# Patient Record
Sex: FEMALE | Race: WHITE | NOT HISPANIC OR LATINO | URBAN - METROPOLITAN AREA
[De-identification: names, ages, dates, MRNs, and addresses within clinical notes are randomized per-mention and may not be internally consistent; named-entity substitution may affect disease eponyms.]

---

## 2018-03-01 ENCOUNTER — OUTPATIENT (OUTPATIENT)
Dept: OUTPATIENT SERVICES | Facility: HOSPITAL | Age: 23
LOS: 1 days | End: 2018-03-01
Payer: MEDICAID

## 2018-03-01 PROCEDURE — G9001: CPT

## 2018-03-11 ENCOUNTER — EMERGENCY (EMERGENCY)
Facility: HOSPITAL | Age: 23
LOS: 1 days | Discharge: HOME | End: 2018-03-11
Attending: EMERGENCY MEDICINE | Admitting: EMERGENCY MEDICINE
Payer: MEDICAID

## 2018-03-11 VITALS
RESPIRATION RATE: 18 BRPM | HEART RATE: 75 BPM | SYSTOLIC BLOOD PRESSURE: 109 MMHG | TEMPERATURE: 98 F | OXYGEN SATURATION: 99 % | DIASTOLIC BLOOD PRESSURE: 70 MMHG

## 2018-03-11 DIAGNOSIS — N93.9 ABNORMAL UTERINE AND VAGINAL BLEEDING, UNSPECIFIED: ICD-10-CM

## 2018-03-11 LAB
ALBUMIN SERPL ELPH-MCNC: 3.8 G/DL — SIGNIFICANT CHANGE UP (ref 3.4–5)
ALP SERPL-CCNC: 61 U/L — SIGNIFICANT CHANGE UP (ref 40–120)
ALT FLD-CCNC: 15 U/L — SIGNIFICANT CHANGE UP (ref 12–42)
ANION GAP SERPL CALC-SCNC: 10 MMOL/L — SIGNIFICANT CHANGE UP (ref 9–16)
APPEARANCE UR: CLEAR — SIGNIFICANT CHANGE UP
AST SERPL-CCNC: 16 U/L — SIGNIFICANT CHANGE UP (ref 15–37)
BACTERIA # UR AUTO: PRESENT /HPF
BASOPHILS NFR BLD AUTO: 0.7 % — SIGNIFICANT CHANGE UP (ref 0–2)
BILIRUB SERPL-MCNC: 0.6 MG/DL — SIGNIFICANT CHANGE UP (ref 0.2–1.2)
BILIRUB UR-MCNC: NEGATIVE — SIGNIFICANT CHANGE UP
BUN SERPL-MCNC: 11 MG/DL — SIGNIFICANT CHANGE UP (ref 7–23)
CALCIUM SERPL-MCNC: 9.2 MG/DL — SIGNIFICANT CHANGE UP (ref 8.5–10.5)
CHLORIDE SERPL-SCNC: 105 MMOL/L — SIGNIFICANT CHANGE UP (ref 96–108)
CO2 SERPL-SCNC: 25 MMOL/L — SIGNIFICANT CHANGE UP (ref 22–31)
COLOR SPEC: YELLOW — SIGNIFICANT CHANGE UP
CREAT SERPL-MCNC: 0.72 MG/DL — SIGNIFICANT CHANGE UP (ref 0.5–1.3)
DIFF PNL FLD: (no result)
EOSINOPHIL NFR BLD AUTO: 0.5 % — SIGNIFICANT CHANGE UP (ref 0–6)
EPI CELLS # UR: SIGNIFICANT CHANGE UP /HPF
GLUCOSE SERPL-MCNC: 103 MG/DL — HIGH (ref 70–99)
GLUCOSE UR QL: NEGATIVE — SIGNIFICANT CHANGE UP
HCG SERPL-ACNC: <1 MIU/ML — SIGNIFICANT CHANGE UP
HCG UR QL: NEGATIVE — SIGNIFICANT CHANGE UP
HCT VFR BLD CALC: 37.8 % — SIGNIFICANT CHANGE UP (ref 34.5–45)
HGB BLD-MCNC: 12.4 G/DL — SIGNIFICANT CHANGE UP (ref 11.5–15.5)
IMM GRANULOCYTES NFR BLD AUTO: 0.3 % — SIGNIFICANT CHANGE UP (ref 0–1.5)
KETONES UR-MCNC: NEGATIVE — SIGNIFICANT CHANGE UP
LEUKOCYTE ESTERASE UR-ACNC: NEGATIVE — SIGNIFICANT CHANGE UP
LYMPHOCYTES # BLD AUTO: 10.9 % — LOW (ref 13–44)
MCHC RBC-ENTMCNC: 29 PG — SIGNIFICANT CHANGE UP (ref 27–34)
MCHC RBC-ENTMCNC: 32.8 G/DL — SIGNIFICANT CHANGE UP (ref 32–36)
MCV RBC AUTO: 88.3 FL — SIGNIFICANT CHANGE UP (ref 80–100)
MONOCYTES NFR BLD AUTO: 8.1 % — SIGNIFICANT CHANGE UP (ref 2–14)
NEUTROPHILS NFR BLD AUTO: 79.5 % — HIGH (ref 43–77)
NITRITE UR-MCNC: NEGATIVE — SIGNIFICANT CHANGE UP
PH UR: 5.5 — SIGNIFICANT CHANGE UP (ref 5–8)
PLATELET # BLD AUTO: 220 K/UL — SIGNIFICANT CHANGE UP (ref 150–400)
POTASSIUM SERPL-MCNC: 3.7 MMOL/L — SIGNIFICANT CHANGE UP (ref 3.5–5.3)
POTASSIUM SERPL-SCNC: 3.7 MMOL/L — SIGNIFICANT CHANGE UP (ref 3.5–5.3)
PROT SERPL-MCNC: 7.4 G/DL — SIGNIFICANT CHANGE UP (ref 6.4–8.2)
PROT UR-MCNC: NEGATIVE MG/DL — SIGNIFICANT CHANGE UP
RBC # BLD: 4.28 M/UL — SIGNIFICANT CHANGE UP (ref 3.8–5.2)
RBC # FLD: 12.7 % — SIGNIFICANT CHANGE UP (ref 10.3–16.9)
RBC CASTS # UR COMP ASSIST: > 10 /HPF
SODIUM SERPL-SCNC: 140 MMOL/L — SIGNIFICANT CHANGE UP (ref 132–145)
SP GR SPEC: 1.02 — SIGNIFICANT CHANGE UP (ref 1–1.03)
UROBILINOGEN FLD QL: 0.2 E.U./DL — SIGNIFICANT CHANGE UP
WBC # BLD: 11.4 K/UL — HIGH (ref 3.8–10.5)
WBC # FLD AUTO: 11.4 K/UL — HIGH (ref 3.8–10.5)
WBC UR QL: < 5 /HPF — SIGNIFICANT CHANGE UP

## 2018-03-11 PROCEDURE — 99284 EMERGENCY DEPT VISIT MOD MDM: CPT

## 2018-03-11 PROCEDURE — 76830 TRANSVAGINAL US NON-OB: CPT | Mod: 26

## 2018-03-11 RX ORDER — SODIUM CHLORIDE 9 MG/ML
1000 INJECTION INTRAMUSCULAR; INTRAVENOUS; SUBCUTANEOUS ONCE
Qty: 0 | Refills: 0 | Status: COMPLETED | OUTPATIENT
Start: 2018-03-11 | End: 2018-03-11

## 2018-03-11 RX ORDER — KETOROLAC TROMETHAMINE 30 MG/ML
30 SYRINGE (ML) INJECTION ONCE
Qty: 0 | Refills: 0 | Status: DISCONTINUED | OUTPATIENT
Start: 2018-03-11 | End: 2018-03-11

## 2018-03-11 RX ADMIN — SODIUM CHLORIDE 2000 MILLILITER(S): 9 INJECTION INTRAMUSCULAR; INTRAVENOUS; SUBCUTANEOUS at 13:55

## 2018-03-11 RX ADMIN — Medication 30 MILLIGRAM(S): at 14:02

## 2018-03-11 RX ADMIN — Medication 30 MILLIGRAM(S): at 13:54

## 2018-03-11 NOTE — ED PROVIDER NOTE - PROGRESS NOTE DETAILS
Neg HCG, normal H/H, UA no signs of infection. Pelvic exam: non tender, no CMT, no massess, some blood on vag vault, no discharge. Pt added that she had a dose of plan B last week. This might explain change in her usual cycle. She will see her OB GYN tomorrow.

## 2018-03-11 NOTE — ED PROVIDER NOTE - OBJECTIVE STATEMENT
21 y/o F with no significant pmhx presenting for evaluation of vaginal bleeding which started today. LNMP 3 weeks ago. Pt reports regular periods and states this bleeding is too soon and not as heavy as her regular period. Also endorsing suprapubic pain on R side.     No f/c, dysuria, hematuria, nausea, vomiting, diarrhea. 21 y/o F with no significant pmhx presenting for evaluation of vaginal bleeding which started today. LNMP 3 weeks ago. Pt endorses regular periods and states this bleeding is too soon and not as heavy as her regular period. Also endorsing suprapubic pain on R side. Not on OCPs. Sexually active with barrier protection. Took a negative pregnancy test at home on Monday. No f/c, dysuria, hematuria, flank pain, nausea, vomiting, diarrhea.

## 2018-03-15 DIAGNOSIS — R69 ILLNESS, UNSPECIFIED: ICD-10-CM

## 2019-02-24 ENCOUNTER — EMERGENCY (EMERGENCY)
Facility: HOSPITAL | Age: 24
LOS: 1 days | Discharge: HOME | End: 2019-02-24
Attending: EMERGENCY MEDICINE | Admitting: EMERGENCY MEDICINE
Payer: OTHER MISCELLANEOUS

## 2019-02-24 VITALS
SYSTOLIC BLOOD PRESSURE: 112 MMHG | OXYGEN SATURATION: 100 % | HEART RATE: 78 BPM | TEMPERATURE: 98 F | DIASTOLIC BLOOD PRESSURE: 74 MMHG | RESPIRATION RATE: 18 BRPM

## 2019-02-24 VITALS
RESPIRATION RATE: 18 BRPM | SYSTOLIC BLOOD PRESSURE: 109 MMHG | OXYGEN SATURATION: 100 % | DIASTOLIC BLOOD PRESSURE: 71 MMHG | HEART RATE: 80 BPM | TEMPERATURE: 98 F

## 2019-02-24 DIAGNOSIS — S70.02XA CONTUSION OF LEFT HIP, INITIAL ENCOUNTER: ICD-10-CM
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
## 2019-02-24 DIAGNOSIS — S83.92XA SPRAIN OF UNSPECIFIED SITE OF LEFT KNEE, INITIAL ENCOUNTER: ICD-10-CM

## 2019-02-24 DIAGNOSIS — M25.552 PAIN IN LEFT HIP: ICD-10-CM
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
## 2019-02-24 PROCEDURE — 73562 X-RAY EXAM OF KNEE 3: CPT

## 2019-02-24 PROCEDURE — 99283 EMERGENCY DEPT VISIT LOW MDM: CPT

## 2019-02-24 PROCEDURE — 99284 EMERGENCY DEPT VISIT MOD MDM: CPT

## 2019-02-24 PROCEDURE — 73562 X-RAY EXAM OF KNEE 3: CPT | Mod: 26,LT

## 2019-02-24 PROCEDURE — 73502 X-RAY EXAM HIP UNI 2-3 VIEWS: CPT

## 2019-02-24 PROCEDURE — 73502 X-RAY EXAM HIP UNI 2-3 VIEWS: CPT | Mod: 26,LT

## 2019-02-24 RX ORDER — IBUPROFEN 200 MG
600 TABLET ORAL ONCE
Qty: 0 | Refills: 0 | Status: COMPLETED | OUTPATIENT
Start: 2019-02-24 | End: 2019-02-24

## 2019-02-24 RX ADMIN — Medication 600 MILLIGRAM(S): at 18:38

## 2019-02-24 NOTE — ED PROVIDER NOTE - OBJECTIVE STATEMENT
here with pain in left knee/ hip after trip and fall going down stairs.  No dizziness/ loc.  Says pain worse with movement/ hurts to try and bend knee.  Did not take anything for pain.  States she is not pregnant

## 2019-02-24 NOTE — ED ADULT NURSE NOTE - OBJECTIVE STATEMENT
22 y/o female BIBA c/o left knee and elbow pain s/p fall. pt reports walking up stairs and stepping on the shoelace and falling facing forward and to the left side.  decreased ROM to the left knee, + swelling and redness.  pt reports LOC.

## 2019-02-24 NOTE — ED PROVIDER NOTE - CLINICAL SUMMARY MEDICAL DECISION MAKING FREE TEXT BOX
mechanical fall with left leg injury.  suspect sprain/ contusion of knee and contusion of hip.  able to ambulate but with pain.  xray neg for fracture.  plan ice, motrin, prn crutches and ortho f/u

## 2019-02-24 NOTE — ED ADULT TRIAGE NOTE - CHIEF COMPLAINT QUOTE
pt BIBA s/o mechanical fall at work, pt report going up the stairs and missing the 3rd step and hitting her face, left arm and leg.  redness and swelling to the left knee, decreased ROM and pain with movement. no deformity presents. pt able to move left arm without difficulty. questionable LOC

## 2019-02-24 NOTE — ED PROVIDER NOTE - MUSCULOSKELETAL, MLM
Spine appears normal, abrasion/ contusion to anterior left knee and left hip tender to touch.  able to range knee slowly but reports pain.  no swelling noted.

## 2019-02-24 NOTE — ED PROVIDER NOTE - DIAGNOSTIC INTERPRETATION
hip xray: no fracture or dislocation, soft tissues normal read by Dr. Cordero  knee xray: no fracture or dislocation, soft tissues normal read by Dr. Cordero

## 2019-02-24 NOTE — ED PROVIDER NOTE - NSFOLLOWUPINSTRUCTIONS_ED_ALL_ED_FT
Please see referral to orthopedics if pain persists despite a few days of rest and motrin.  Use crutches for comfort until pain resolves or cleared at followup.  Return to the ER if symptoms worsen or other concerns.    Knee Sprain    WHAT YOU NEED TO KNOW:    A knee sprain occurs when one or more ligaments in your knee are suddenly stretched or torn. Ligaments are tissues that hold bones together. Ligaments support the knee and keep the joint and bones in the correct position. Knee Anatomy          DISCHARGE INSTRUCTIONS:    Return to the emergency department if:     Any part of your leg feels cold, numb, or looks pale         Contact your healthcare provider if:     You have new or increased swelling, bruising, or pain in your knee.      Your symptoms do not improve within 6 weeks, even with treatment.      You have questions or concerns about your condition or care.     Medicines:     NSAIDs, such as ibuprofen, help decrease swelling, pain, and fever. This medicine is available with or without a doctor's order. NSAIDs can cause stomach bleeding or kidney problems in certain people. If you take blood thinner medicine, always ask your healthcare provider if NSAIDs are safe for you. Always read the medicine label and follow directions.      Acetaminophen decreases pain and fever. It is available without a doctor's order. Ask how much to take and how often to take it. Follow directions. Read the labels of all other medicines you are using to see if they also contain acetaminophen, or ask your doctor or pharmacist. Acetaminophen can cause liver damage if not taken correctly. Do not use more than 4 grams (4,000 milligrams) total of acetaminophen in one day.       Prescription pain medicine may be given. Ask how to take this medicine safely.       Take your medicine as directed. Contact your healthcare provider if you think your medicine is not helping or if you have side effects. Tell him or her if you are allergic to any medicine. Keep a list of the medicines, vitamins, and herbs you take. Include the amounts, and when and why you take them. Bring the list or the pill bottles to follow-up visits. Carry your medicine list with you in case of an emergency.    Self-care:     Rest your knee and do not exercise. You may be told to keep weight off your knee. This means that you should not walk on your injured leg. Rest helps decrease swelling and allows the injury to heal. You can do gentle range of motion (ROM) exercises as directed. This will prevent stiffness.       Apply ice on your knee for 15 to 20 minutes every hour or as directed. Use an ice pack, or put crushed ice in a plastic bag. Cover it with a towel. Ice helps prevent tissue damage and decreases swelling and pain.      Apply compression to your knee as directed. You may need to wear an elastic bandage. This helps keep your injured knee from moving too much while it heals. You can loosen or tighten the elastic bandage to make it comfortable. It should be tight enough for you to feel support. It should not be so tight that it causes your toes to feel numb or tingly. If you are wearing an elastic bandage, take it off and rewrap it once a day.       Elevate your knee above the level of your heart as often as you can. This will help decrease swelling and pain. Prop your leg on pillows or blankets to keep it elevated comfortably. Do not put pillows directly behind your knee.       Use support devices as directed: Support devices such as a splint or brace may be needed. These devices limit movement and protect your joint while it heals. You may be given crutches to use until you can stand on your injured leg without pain. Use devices as directed.     Physical therapy: A physical therapist teaches you exercises to help improve movement and strength, and to decrease pain.     Prevent another knee sprain: Exercise your legs to keep your muscles strong. Strong leg muscles help protect your knee and prevent strain. The following may also prevent a knee sprain:     Slowly start your exercise or training program. Slowly increase the time, distance, and intensity of your exercise. Sudden increases in training may cause you to injure your knee again.       Wear protective braces and equipment as directed. Braces may prevent your knee from moving the wrong way and causing another sprain. Protective equipment may support your bones and ligaments to prevent injury.       Warm up and stretch before exercise. Warm up by walking or using an exercise bike before starting your regular exercise. Do gentle stretches after warming up. This helps to loosen your muscles and decrease stress on your knee. Cool down and stretch after you exercise.       Wear shoes that fit correctly and support your feet. Replace your running or exercise shoes before the padding or shock absorption is worn out. Ask your healthcare provider which exercise shoes are best for you. Ask if you should wear special shoe inserts. Shoe inserts can help support your heels and arches or keep your foot lined up correctly in your shoes. Exercise on flat surfaces.    Follow up with your healthcare provider as directed: Write down your questions so you remember to ask them during your visits.       Contusion    A contusion is a deep bruise. Contusions are the result of a blunt injury to tissues and muscle fibers under the skin. The skin overlying the contusion may turn blue, purple, or yellow. Symptoms also include pain and swelling in the injured area.    SEEK IMMEDIATE MEDICAL CARE IF YOU HAVE ANY OF THE FOLLOWING SYMPTOMS: severe pain, numbness, tingling, pain, weakness, or skin color/temperature change in any part of your body distal to the injury.

## 2019-02-24 NOTE — ED PROVIDER NOTE - CARE PROVIDER_API CALL
Keshawn Lugo)  Orthopaedic Surgery  [address] [place] [address]  Phone: [phone]  Fax: [phone]  Follow Up Time:     Sanju Mena)  Orthopaedic Surgery  [address] [place] [id number]  Phone: [phone]  Fax: [phone]  Follow Up Time:

## 2019-12-14 ENCOUNTER — EMERGENCY (EMERGENCY)
Facility: HOSPITAL | Age: 24
LOS: 1 days | Discharge: HOME | End: 2019-12-14
Attending: EMERGENCY MEDICINE | Admitting: EMERGENCY MEDICINE
Payer: MEDICAID

## 2019-12-14 VITALS
WEIGHT: 115.96 LBS | DIASTOLIC BLOOD PRESSURE: 73 MMHG | HEIGHT: 66 IN | HEART RATE: 75 BPM | SYSTOLIC BLOOD PRESSURE: 106 MMHG | OXYGEN SATURATION: 98 % | TEMPERATURE: 98 F | RESPIRATION RATE: 16 BRPM

## 2019-12-14 DIAGNOSIS — B37.3 CANDIDIASIS OF VULVA AND VAGINA: ICD-10-CM

## 2019-12-14 DIAGNOSIS — R10.2 PELVIC AND PERINEAL PAIN: ICD-10-CM

## 2019-12-14 LAB
ALBUMIN SERPL ELPH-MCNC: 3.6 G/DL — SIGNIFICANT CHANGE UP (ref 3.4–5)
ALP SERPL-CCNC: 62 U/L — SIGNIFICANT CHANGE UP (ref 40–120)
ALT FLD-CCNC: 20 U/L — SIGNIFICANT CHANGE UP (ref 12–42)
ANION GAP SERPL CALC-SCNC: 5 MMOL/L — LOW (ref 9–16)
APPEARANCE UR: CLEAR — SIGNIFICANT CHANGE UP
AST SERPL-CCNC: 12 U/L — LOW (ref 15–37)
BASOPHILS # BLD AUTO: 0.04 K/UL — SIGNIFICANT CHANGE UP (ref 0–0.2)
BASOPHILS NFR BLD AUTO: 0.7 % — SIGNIFICANT CHANGE UP (ref 0–2)
BILIRUB SERPL-MCNC: 0.6 MG/DL — SIGNIFICANT CHANGE UP (ref 0.2–1.2)
BILIRUB UR-MCNC: NEGATIVE — SIGNIFICANT CHANGE UP
BUN SERPL-MCNC: 7 MG/DL — SIGNIFICANT CHANGE UP (ref 7–23)
CALCIUM SERPL-MCNC: 8.6 MG/DL — SIGNIFICANT CHANGE UP (ref 8.5–10.5)
CHLORIDE SERPL-SCNC: 104 MMOL/L — SIGNIFICANT CHANGE UP (ref 96–108)
CO2 SERPL-SCNC: 28 MMOL/L — SIGNIFICANT CHANGE UP (ref 22–31)
COLOR SPEC: YELLOW — SIGNIFICANT CHANGE UP
CREAT SERPL-MCNC: 0.8 MG/DL — SIGNIFICANT CHANGE UP (ref 0.5–1.3)
DIFF PNL FLD: NEGATIVE — SIGNIFICANT CHANGE UP
EOSINOPHIL # BLD AUTO: 0.06 K/UL — SIGNIFICANT CHANGE UP (ref 0–0.5)
EOSINOPHIL NFR BLD AUTO: 1.1 % — SIGNIFICANT CHANGE UP (ref 0–6)
GLUCOSE SERPL-MCNC: 121 MG/DL — HIGH (ref 70–99)
GLUCOSE UR QL: NEGATIVE — SIGNIFICANT CHANGE UP
HCG SERPL-ACNC: 1 MIU/ML — SIGNIFICANT CHANGE UP
HCT VFR BLD CALC: 38.3 % — SIGNIFICANT CHANGE UP (ref 34.5–45)
HGB BLD-MCNC: 12.2 G/DL — SIGNIFICANT CHANGE UP (ref 11.5–15.5)
IMM GRANULOCYTES NFR BLD AUTO: 0.2 % — SIGNIFICANT CHANGE UP (ref 0–1.5)
KETONES UR-MCNC: NEGATIVE — SIGNIFICANT CHANGE UP
LEUKOCYTE ESTERASE UR-ACNC: NEGATIVE — SIGNIFICANT CHANGE UP
LIDOCAIN IGE QN: 107 U/L — SIGNIFICANT CHANGE UP (ref 73–393)
LYMPHOCYTES # BLD AUTO: 1.16 K/UL — SIGNIFICANT CHANGE UP (ref 1–3.3)
LYMPHOCYTES # BLD AUTO: 20.4 % — SIGNIFICANT CHANGE UP (ref 13–44)
MCHC RBC-ENTMCNC: 28.5 PG — SIGNIFICANT CHANGE UP (ref 27–34)
MCHC RBC-ENTMCNC: 31.9 GM/DL — LOW (ref 32–36)
MCV RBC AUTO: 89.5 FL — SIGNIFICANT CHANGE UP (ref 80–100)
MONOCYTES # BLD AUTO: 0.49 K/UL — SIGNIFICANT CHANGE UP (ref 0–0.9)
MONOCYTES NFR BLD AUTO: 8.6 % — SIGNIFICANT CHANGE UP (ref 2–14)
NEUTROPHILS # BLD AUTO: 3.93 K/UL — SIGNIFICANT CHANGE UP (ref 1.8–7.4)
NEUTROPHILS NFR BLD AUTO: 69 % — SIGNIFICANT CHANGE UP (ref 43–77)
NITRITE UR-MCNC: NEGATIVE — SIGNIFICANT CHANGE UP
NRBC # BLD: 0 /100 WBCS — SIGNIFICANT CHANGE UP (ref 0–0)
PH UR: 6 — SIGNIFICANT CHANGE UP (ref 5–8)
PLATELET # BLD AUTO: 202 K/UL — SIGNIFICANT CHANGE UP (ref 150–400)
POTASSIUM SERPL-MCNC: 3.9 MMOL/L — SIGNIFICANT CHANGE UP (ref 3.5–5.3)
POTASSIUM SERPL-SCNC: 3.9 MMOL/L — SIGNIFICANT CHANGE UP (ref 3.5–5.3)
PROT SERPL-MCNC: 7.1 G/DL — SIGNIFICANT CHANGE UP (ref 6.4–8.2)
PROT UR-MCNC: NEGATIVE MG/DL — SIGNIFICANT CHANGE UP
RBC # BLD: 4.28 M/UL — SIGNIFICANT CHANGE UP (ref 3.8–5.2)
RBC # FLD: 12.4 % — SIGNIFICANT CHANGE UP (ref 10.3–14.5)
SODIUM SERPL-SCNC: 137 MMOL/L — SIGNIFICANT CHANGE UP (ref 132–145)
SP GR SPEC: 1.01 — SIGNIFICANT CHANGE UP (ref 1–1.03)
UROBILINOGEN FLD QL: 0.2 E.U./DL — SIGNIFICANT CHANGE UP
WBC # BLD: 5.69 K/UL — SIGNIFICANT CHANGE UP (ref 3.8–10.5)
WBC # FLD AUTO: 5.69 K/UL — SIGNIFICANT CHANGE UP (ref 3.8–10.5)

## 2019-12-14 PROCEDURE — 76856 US EXAM PELVIC COMPLETE: CPT | Mod: 26

## 2019-12-14 PROCEDURE — 76830 TRANSVAGINAL US NON-OB: CPT | Mod: 26

## 2019-12-14 PROCEDURE — 99284 EMERGENCY DEPT VISIT MOD MDM: CPT

## 2019-12-14 RX ORDER — FLUCONAZOLE 150 MG/1
150 TABLET ORAL ONCE
Refills: 0 | Status: COMPLETED | OUTPATIENT
Start: 2019-12-14 | End: 2019-12-14

## 2019-12-14 RX ORDER — ACETAMINOPHEN 500 MG
975 TABLET ORAL ONCE
Refills: 0 | Status: COMPLETED | OUTPATIENT
Start: 2019-12-14 | End: 2019-12-14

## 2019-12-14 RX ADMIN — FLUCONAZOLE 150 MILLIGRAM(S): 150 TABLET ORAL at 13:05

## 2019-12-14 RX ADMIN — Medication 975 MILLIGRAM(S): at 10:41

## 2019-12-14 NOTE — ED PROVIDER NOTE - CARE PROVIDER_API CALL
Eben Bhakta)  Obstetrics and Gynecology  [address], [facility] [place] [address]  Phone: [phone]  Fax: [phone]  Follow Up Time:

## 2019-12-14 NOTE — ED PROVIDER NOTE - CLINICAL SUMMARY MEDICAL DECISION MAKING FREE TEXT BOX
BMI for Adults  Body mass index (BMI) is a number that is calculated from a person's weight and height. In most adults, the number is used to find how much of an adult's weight is made up of fat. BMI is not as accurate as a direct measure of body fat.  How is BMI calculated?  BMI is calculated by dividing weight in kilograms by height in meters squared. It can also be calculated by dividing weight in pounds by height in inches squared, then multiplying the resulting number by 703. Charts are available to help you find your BMI quickly and easily without doing this calculation.  How is BMI interpreted?  Health care professionals use BMI charts to identify whether an adult is underweight, at a normal weight, or overweight based on the following guidelines:  · Underweight: BMI less than 18.5.  · Normal weight: BMI between 18.5 and 24.9.  · Overweight: BMI between 25 and 29.9.  · Obese: BMI of 30 and above.  BMI is usually interpreted the same for males and females.  Weight includes both fat and muscle, so someone with a muscular build, such as an athlete, may have a BMI that is higher than 24.9. In cases like these, BMI may not accurately depict body fat. To determine if excess body fat is the cause of a BMI of 25 or higher, further assessments may need to be done by a health care provider.  Why is BMI a useful tool?  BMI is used to identify a possible weight problem that may be related to a medical problem or may increase the risk for medical problems. BMI can also be used to promote changes to reach a healthy weight.  This information is not intended to replace advice given to you by your health care provider. Make sure you discuss any questions you have with your health care provider.  Document Released: 08/29/2005 Document Revised: 04/27/2017 Document Reviewed: 05/15/2015  [employer] Interactive Patient Education © 2017 [employer]    
+ pelvic pain w/candidal vaginitis, no CMT. Had STD testing performed yesterday at [facility] [place] [place] and does not want to be tested and/or treated again today. No fever/leukocytosis. Do not suspect PID. Otherwise, negative pelvic imaging. Safe to d/c home to f/u with GYN this week.

## 2019-12-14 NOTE — ED ADULT NURSE NOTE - OBJECTIVE STATEMENT
Patient c/o bilateral lower abdominal pain x 3 day associated with vaginal discharge. Denies any nausea , fever, or diarrhea

## 2019-12-14 NOTE — ED PROVIDER NOTE - PATIENT PORTAL LINK FT
You can access the FollowMyHealth Patient Portal offered by [facility] by registering at the following website: [email or web address]. By joining [employer]’s FollowMyHealth portal, you will also be able to view your health information using other applications (apps) compatible with our system.

## 2019-12-14 NOTE — ED PROVIDER NOTE - PHYSICAL EXAMINATION
VITAL SIGNS: I have reviewed nursing notes and confirm.  CONSTITUTIONAL: Well-developed; well-nourished; in no acute distress.  SKIN: Skin is warm and dry, no acute rash.  HEAD: Normocephalic; atraumatic.  EYES: PERRL, EOM intact; conjunctiva and sclera clear.  ENT: No nasal discharge; airway clear.  NECK: Supple; non tender.  CARD: S1, S2 normal; no murmurs, gallops, or rubs. Regular rate and rhythm.  RESP: No wheezes, rales or rhonchi.  ABD: Normal bowel sounds; soft; non-distended; +Suprapubic TTP and RLQ TTP.   EXT: Normal ROM. No clubbing, cyanosis or edema.  NEURO: Alert, oriented. Grossly unremarkable.  PSYCH: Cooperative, appropriate. VITAL SIGNS: I have reviewed nursing notes and confirm.  CONSTITUTIONAL: Well-developed; well-nourished; in no acute distress.  SKIN: Skin is warm and dry, no acute rash.  HEAD: Normocephalic; atraumatic.  EYES: PERRL, EOM intact; conjunctiva and sclera clear.  ENT: No nasal discharge; airway clear.  NECK: Supple; non tender.  CARD: S1, S2 normal; no murmurs, gallops, or rubs. Regular rate and rhythm.  RESP: No wheezes, rales or rhonchi.  ABD: Normal bowel sounds; soft; non-distended; +Suprapubic TTP and RLQ TTP.   PELVIC: no lesions, + chunky white discharge w/mild vaginal wall erythema, erythematous cervix w/out CMT, closed os, no bleeding, mild adnexal TTP b/l  EXT: Normal ROM. No clubbing, cyanosis or edema.  NEURO: Alert, oriented. Grossly unremarkable.  PSYCH: Cooperative, appropriate.

## 2019-12-14 NOTE — ED PROVIDER NOTE - OBJECTIVE STATEMENT
23 y/o female, , with no pertinent PMHx presents to the ED with complaints of bilateral pelvic pain, L>R, constant x 3 days with some vaginal discharge. No N/V. Normal stools. Denies fever, chills, rashes, or skin changes. LNMP 12. Pt is sexually active, not on contraceptives. Pt had a similar episode 1 year ago in which pain resolved on its own without seeking any medical attention at that time. +Pap smear 6 years ago, and Pt is concerned for cancer.

## 2021-06-25 NOTE — ED PROVIDER NOTE - NSFOLLOWUPINSTRUCTIONS_ED_ALL_ED_FT
Discharge given by provider. Pt verbalizes understanding. Pt is ambulatory and leaving with family. Log Out.    [employer] CareNotes®     :  [facility]             PELVIC PAIN IN WOMEN - AfterCare(R) Instructions(ER/ED)     Pelvic Pain in Women    WHAT YOU NEED TO KNOW:    You may have pain on one or both sides of your pelvis. Pelvic pain may occur with certain body positions or activities, such as when you have sex or a bowel movement. It may worsen during your monthly period or after you sit or stand for a long time. Chronic pelvic pain is pain that continues for longer than 6 months.    DISCHARGE INSTRUCTIONS:    Call 911 for any of the following:     You have severe chest pain and sudden trouble breathing.        Return to the emergency department if:     You have heavy or unusual vaginal bleeding, and you feel lightheaded or faint.        Contact your healthcare provider if:     You have pelvic pain that does not go away after you take pain medicine.      You develop new symptoms or your symptoms are worse than before.      You have questions or concerns about your condition or care.    Medicines: You may need any of the following:     Pain medicine may be given in pills or creams to relieve your pain.       Hormones may be given if your pain gets worse with your menstrual cycle.      Antibiotics may be given if your pain is caused by infection.      Take your medicine as directed. Contact your healthcare provider if you think your medicine is not helping or if you have side effects. Tell him or her if you are allergic to any medicine. Keep a list of the medicines, vitamins, and herbs you take. Include the amounts, and when and why you take them. Bring the list or the pill bottles to follow-up visits. Carry your medicine list with you in case of an emergency.    Self-care:     Keep a pain diary. Write down when your pain happens, how severe it is, and any other symptoms you have with your pain. A diary will help you keep track of pain cycles. It may also help your healthcare provider find out what is causing your pain.      Learn ways to relax. Deep breathing, meditation, and relaxation techniques can help decrease your pain. When you are tense, your pain may increase.      Change the foods you eat if you have irritable bowel syndrome. Ask your healthcare provider about the best foods for you.     Follow up with your healthcare provider as directed: Write down your questions so you remember to ask them during your visits.

## 2023-09-19 ENCOUNTER — OFFICE VISIT (OUTPATIENT)
Dept: OBSTETRICS AND GYNECOLOGY | Facility: CLINIC | Age: 28
End: 2023-09-19
Payer: COMMERCIAL

## 2023-09-19 VITALS — HEIGHT: 66 IN | WEIGHT: 129.19 LBS | BODY MASS INDEX: 20.76 KG/M2

## 2023-09-19 DIAGNOSIS — Z11.3 SCREEN FOR STD (SEXUALLY TRANSMITTED DISEASE): ICD-10-CM

## 2023-09-19 DIAGNOSIS — B96.89 BV (BACTERIAL VAGINOSIS): ICD-10-CM

## 2023-09-19 DIAGNOSIS — Z01.419 WELL WOMAN EXAM WITH ROUTINE GYNECOLOGICAL EXAM: Primary | ICD-10-CM

## 2023-09-19 DIAGNOSIS — N76.0 BV (BACTERIAL VAGINOSIS): ICD-10-CM

## 2023-09-19 PROCEDURE — 99385 PR PREVENTIVE VISIT,NEW,18-39: ICD-10-PCS | Mod: S$GLB,,, | Performed by: OBSTETRICS & GYNECOLOGY

## 2023-09-19 PROCEDURE — 1160F PR REVIEW ALL MEDS BY PRESCRIBER/CLIN PHARMACIST DOCUMENTED: ICD-10-PCS | Mod: CPTII,S$GLB,, | Performed by: OBSTETRICS & GYNECOLOGY

## 2023-09-19 PROCEDURE — 87591 N.GONORRHOEAE DNA AMP PROB: CPT | Performed by: OBSTETRICS & GYNECOLOGY

## 2023-09-19 PROCEDURE — 1159F MED LIST DOCD IN RCRD: CPT | Mod: CPTII,S$GLB,, | Performed by: OBSTETRICS & GYNECOLOGY

## 2023-09-19 PROCEDURE — 3008F BODY MASS INDEX DOCD: CPT | Mod: CPTII,S$GLB,, | Performed by: OBSTETRICS & GYNECOLOGY

## 2023-09-19 PROCEDURE — 88175 CYTOPATH C/V AUTO FLUID REDO: CPT | Performed by: OBSTETRICS & GYNECOLOGY

## 2023-09-19 PROCEDURE — 87624 HPV HI-RISK TYP POOLED RSLT: CPT | Performed by: OBSTETRICS & GYNECOLOGY

## 2023-09-19 PROCEDURE — 99999 PR PBB SHADOW E&M-NEW PATIENT-LVL III: CPT | Mod: PBBFAC,,, | Performed by: OBSTETRICS & GYNECOLOGY

## 2023-09-19 PROCEDURE — 3008F PR BODY MASS INDEX (BMI) DOCUMENTED: ICD-10-PCS | Mod: CPTII,S$GLB,, | Performed by: OBSTETRICS & GYNECOLOGY

## 2023-09-19 PROCEDURE — 87210 SMEAR WET MOUNT SALINE/INK: CPT | Mod: QW,S$GLB,, | Performed by: OBSTETRICS & GYNECOLOGY

## 2023-09-19 PROCEDURE — 1160F RVW MEDS BY RX/DR IN RCRD: CPT | Mod: CPTII,S$GLB,, | Performed by: OBSTETRICS & GYNECOLOGY

## 2023-09-19 PROCEDURE — 99999 PR PBB SHADOW E&M-NEW PATIENT-LVL III: ICD-10-PCS | Mod: PBBFAC,,, | Performed by: OBSTETRICS & GYNECOLOGY

## 2023-09-19 PROCEDURE — 99385 PREV VISIT NEW AGE 18-39: CPT | Mod: S$GLB,,, | Performed by: OBSTETRICS & GYNECOLOGY

## 2023-09-19 PROCEDURE — 1159F PR MEDICATION LIST DOCUMENTED IN MEDICAL RECORD: ICD-10-PCS | Mod: CPTII,S$GLB,, | Performed by: OBSTETRICS & GYNECOLOGY

## 2023-09-19 PROCEDURE — 87210 PR  SMEAR,STAIN,WET MNT,INTERP: ICD-10-PCS | Mod: QW,S$GLB,, | Performed by: OBSTETRICS & GYNECOLOGY

## 2023-09-19 RX ORDER — METRONIDAZOLE 500 MG/1
500 TABLET ORAL 2 TIMES DAILY
Qty: 14 TABLET | Refills: 0 | Status: SHIPPED | OUTPATIENT
Start: 2023-09-19 | End: 2023-09-22

## 2023-09-19 NOTE — PROGRESS NOTES
Subjective:      Patient ID: Mariela Kwok is a 27 y.o. female.    Chief Complaint:  Annual Exam      History of Present Illness  HPI  Annual Exam-Premenopausal  Patient presents for annual exam. The patient reports complaints of vaginal discharge and discomfort.  Believes she may have BV again.  Pt also reports that her partner felt a lump in her right breast several weeks ago.  Has not felt it since. The patient is sexually active (condoms). GYN screening history: last pap: a couple of years ago and had HPV . The patient wears seatbelts: yes. The patient participates in regular exercise: yes. Has the patient ever been transfused or tattooed?: no. The patient reports that there is not domestic violence in her life.  Menses are regular with periods lasting 4 days.  Denies excessive bleeding or cramping.  Requests STD screening.  Denies known exposure.  STD history: Gc, HPV (on pap).  Pt completed HPV vaccine.      GYN & OB History  Patient's last menstrual period was 2023 (approximate).   Date of Last Pap: No result found    OB History    Para Term  AB Living   0 0 0 0 0 0   SAB IAB Ectopic Multiple Live Births   0 0 0 0 0       Review of Systems  Review of Systems   Constitutional:  Negative for activity change, appetite change, chills, fatigue, fever and unexpected weight change.   Respiratory:  Negative for shortness of breath.    Cardiovascular:  Negative for chest pain, palpitations and leg swelling.   Gastrointestinal:  Positive for bloating and constipation. Negative for abdominal pain, blood in stool, diarrhea, nausea and vomiting.   Genitourinary:  Positive for vaginal discharge, vaginal pain and vaginal odor. Negative for dysmenorrhea, dyspareunia, dysuria, flank pain, frequency, genital sores, hematuria, menorrhagia, menstrual problem, pelvic pain, urgency, vaginal bleeding, urinary incontinence, postcoital bleeding and vaginal dryness.   Musculoskeletal:  Negative for back pain.    Integumentary:  Negative for breast mass, nipple discharge, breast skin changes and breast tenderness.   Neurological:  Negative for syncope and headaches.   Breast: Positive for lump.Negative for asymmetry, mass, mastodynia, nipple discharge, skin changes and tenderness         Objective:     Physical Exam:   Constitutional: She is oriented to person, place, and time. She appears well-developed and well-nourished. No distress.    HENT:   Head: Normocephalic and atraumatic.    Eyes: Pupils are equal, round, and reactive to light. EOM are normal.     Cardiovascular:  Normal rate, regular rhythm and normal heart sounds.             Pulmonary/Chest: Effort normal and breath sounds normal. Right breast exhibits no inverted nipple, no mass, no nipple discharge, no skin change, no tenderness, no bleeding and no swelling. Left breast exhibits no inverted nipple, no mass, no nipple discharge, no skin change, no tenderness, no bleeding and no swelling. Breasts are symmetrical.        Abdominal: Soft. Bowel sounds are normal. She exhibits no distension. There is no abdominal tenderness.     Genitourinary:    Uterus, right adnexa and left adnexa normal.      Pelvic exam was performed with patient supine.   There is no rash, tenderness, lesion or injury on the right labia. There is no rash, tenderness, lesion or injury on the left labia. Cervix is normal. Right adnexum displays no mass, no tenderness and no fullness. Left adnexum displays no mass, no tenderness and no fullness. There is erythema and vaginal discharge in the vagina. No tenderness or bleeding in the vagina.    No foreign body in the vagina.      No signs of injury in the vagina.   Cervix exhibits no motion tenderness, no discharge and no friability. Uterus is not deviated, not enlarged and not tender.    Genitourinary Comments: Wet prep: many clue cells; negative for yeast or trichomonas             Musculoskeletal: Normal range of motion and moves all  extremeties. No tenderness or edema.       Neurological: She is alert and oriented to person, place, and time.    Skin: Skin is warm and dry.    Psychiatric: She has a normal mood and affect. Her behavior is normal. Thought content normal.         Assessment:     1. Well woman exam with routine gynecological exam    2. BV (bacterial vaginosis)    3. Screen for STD (sexually transmitted disease)             Plan:     Well woman exam with routine gynecological exam  -     Liquid-Based Pap Smear, Screening  -     HPV High Risk Genotypes, PCR  -     Pt was counseled on cervical/vaginal screening guidelines and recommendations.  Last pap abnormal with HPV per pt recollection (does not recall when).  If today's pap smear result is negative, next pap smear will be due in 3 karyn.  -     Pt was advised on current breast cancer screening recommendations.  Pt desires to proceed with breast exam today.  No evidence of mass/lump today.  Pt reassured.  -     Follow up with PCP for routine health maintenance needs.  -     Pt counseled on contraception.  Pt desires to continue with condoms.  Counseled on Plan B use prn.    BV (bacterial vaginosis)  -     POCT Wet Prep  -     metroNIDAZOLE (FLAGYL) 500 MG tablet; Take 1 tablet (500 mg total) by mouth 2 (two) times daily. for 7 days  Dispense: 14 tablet; Refill: 0  -     Medication details, dosing, risks, side-effects, and interactions were discussed.    Screen for STD (sexually transmitted disease)  -     C. trachomatis/N. gonorrhoeae by AMP DNA      Follow up in about 1 year (around 9/19/2024).

## 2023-09-21 LAB
C TRACH DNA SPEC QL NAA+PROBE: NOT DETECTED
N GONORRHOEA DNA SPEC QL NAA+PROBE: NOT DETECTED

## 2023-09-22 ENCOUNTER — OFFICE VISIT (OUTPATIENT)
Dept: FAMILY MEDICINE | Facility: CLINIC | Age: 28
End: 2023-09-22
Attending: FAMILY MEDICINE
Payer: COMMERCIAL

## 2023-09-22 VITALS
TEMPERATURE: 98 F | HEIGHT: 66 IN | SYSTOLIC BLOOD PRESSURE: 112 MMHG | BODY MASS INDEX: 20.69 KG/M2 | WEIGHT: 128.75 LBS | OXYGEN SATURATION: 95 % | DIASTOLIC BLOOD PRESSURE: 68 MMHG | HEART RATE: 100 BPM

## 2023-09-22 DIAGNOSIS — L71.9 ROSACEA: ICD-10-CM

## 2023-09-22 DIAGNOSIS — M41.9 SCOLIOSIS, UNSPECIFIED SCOLIOSIS TYPE, UNSPECIFIED SPINAL REGION: ICD-10-CM

## 2023-09-22 DIAGNOSIS — F41.9 ANXIETY: ICD-10-CM

## 2023-09-22 DIAGNOSIS — Z00.00 ANNUAL PHYSICAL EXAM: Primary | ICD-10-CM

## 2023-09-22 DIAGNOSIS — N63.10 MASS OF RIGHT BREAST, UNSPECIFIED QUADRANT: ICD-10-CM

## 2023-09-22 DIAGNOSIS — M41.125 ADOLESCENT IDIOPATHIC SCOLIOSIS OF THORACOLUMBAR REGION: ICD-10-CM

## 2023-09-22 DIAGNOSIS — Z20.2 EXPOSURE TO SEXUALLY TRANSMITTED DISEASE (STD): ICD-10-CM

## 2023-09-22 PROBLEM — M41.20 IDIOPATHIC SCOLIOSIS: Status: ACTIVE | Noted: 2023-09-22

## 2023-09-22 PROCEDURE — 3074F SYST BP LT 130 MM HG: CPT | Mod: CPTII,S$GLB,, | Performed by: INTERNAL MEDICINE

## 2023-09-22 PROCEDURE — 1159F MED LIST DOCD IN RCRD: CPT | Mod: CPTII,S$GLB,, | Performed by: INTERNAL MEDICINE

## 2023-09-22 PROCEDURE — 99999 PR PBB SHADOW E&M-EST. PATIENT-LVL V: ICD-10-PCS | Mod: PBBFAC,,, | Performed by: INTERNAL MEDICINE

## 2023-09-22 PROCEDURE — 3074F PR MOST RECENT SYSTOLIC BLOOD PRESSURE < 130 MM HG: ICD-10-PCS | Mod: CPTII,S$GLB,, | Performed by: INTERNAL MEDICINE

## 2023-09-22 PROCEDURE — 99385 PREV VISIT NEW AGE 18-39: CPT | Mod: S$GLB,,, | Performed by: INTERNAL MEDICINE

## 2023-09-22 PROCEDURE — 99999 PR PBB SHADOW E&M-EST. PATIENT-LVL V: CPT | Mod: PBBFAC,,, | Performed by: INTERNAL MEDICINE

## 2023-09-22 PROCEDURE — 3078F DIAST BP <80 MM HG: CPT | Mod: CPTII,S$GLB,, | Performed by: INTERNAL MEDICINE

## 2023-09-22 PROCEDURE — 3008F BODY MASS INDEX DOCD: CPT | Mod: CPTII,S$GLB,, | Performed by: INTERNAL MEDICINE

## 2023-09-22 PROCEDURE — 3008F PR BODY MASS INDEX (BMI) DOCUMENTED: ICD-10-PCS | Mod: CPTII,S$GLB,, | Performed by: INTERNAL MEDICINE

## 2023-09-22 PROCEDURE — 1160F PR REVIEW ALL MEDS BY PRESCRIBER/CLIN PHARMACIST DOCUMENTED: ICD-10-PCS | Mod: CPTII,S$GLB,, | Performed by: INTERNAL MEDICINE

## 2023-09-22 PROCEDURE — 3078F PR MOST RECENT DIASTOLIC BLOOD PRESSURE < 80 MM HG: ICD-10-PCS | Mod: CPTII,S$GLB,, | Performed by: INTERNAL MEDICINE

## 2023-09-22 PROCEDURE — 1160F RVW MEDS BY RX/DR IN RCRD: CPT | Mod: CPTII,S$GLB,, | Performed by: INTERNAL MEDICINE

## 2023-09-22 PROCEDURE — 1159F PR MEDICATION LIST DOCUMENTED IN MEDICAL RECORD: ICD-10-PCS | Mod: CPTII,S$GLB,, | Performed by: INTERNAL MEDICINE

## 2023-09-22 PROCEDURE — 99385 PR PREVENTIVE VISIT,NEW,18-39: ICD-10-PCS | Mod: S$GLB,,, | Performed by: INTERNAL MEDICINE

## 2023-09-22 NOTE — PROGRESS NOTES
Subjective     Patient ID: Mariela Kwok is a 27 y.o. female.    Chief Complaint:annual physical exam      HPI  Patient presents for annual physical exam. She recently moved here from [place]. She reports she has been having constipation. She also reports rosacea and requests a derm referral. She reports she was diagnosed with scoliosis many years ago and requests PT and Ortho referrals. She also reports a right breast lump that she recently noticed. She also reports anxiety after her move and would like therapy      Review of Systems   Constitutional:  Negative for chills and fatigue.   Respiratory:  Negative for chest tightness and shortness of breath.    Cardiovascular:  Negative for chest pain and palpitations.   Gastrointestinal:  Negative for abdominal pain, constipation, diarrhea, nausea and vomiting.   Genitourinary:  Negative for frequency and urgency.   Neurological:  Negative for dizziness, numbness and headaches.          Objective     Physical Exam  Constitutional:       General: She is not in acute distress.     Appearance: Normal appearance. She is normal weight.   HENT:      Head: Normocephalic and atraumatic.   Cardiovascular:      Rate and Rhythm: Normal rate and regular rhythm.      Heart sounds: Normal heart sounds. No murmur heard.     No friction rub. No gallop.   Pulmonary:      Effort: Pulmonary effort is normal.      Breath sounds: Normal breath sounds. No wheezing, rhonchi or rales.   Abdominal:      General: Bowel sounds are normal. There is no distension.      Palpations: Abdomen is soft.      Tenderness: There is no abdominal tenderness. There is no rebound.   Skin:     General: Skin is warm and dry.      Coloration: Skin is not jaundiced.   Neurological:      General: No focal deficit present.      Mental Status: She is alert and oriented to person, place, and time. Mental status is at baseline.   Psychiatric:         Mood and Affect: Mood normal.         Behavior: Behavior normal.             Assessment and Plan     1. Annual physical exam  Comments:  Will obtain CBC, CMP, TSH, A1c and Lipid panel  Orders:  -     CBC Auto Differential; Future; Expected date: 09/22/2023  -     Comprehensive Metabolic Panel; Future; Expected date: 09/22/2023  -     Hemoglobin A1C; Future; Expected date: 09/22/2023  -     Lipid Panel; Future; Expected date: 09/22/2023  -     TSH; Future; Expected date: 09/22/2023    2. Adolescent idiopathic scoliosis of thoracolumbar region  Comments:  Will refer to ortho  Orders:  -     Ambulatory referral/consult to Orthopedics; Future; Expected date: 09/29/2023    3. Scoliosis, unspecified scoliosis type, unspecified spinal region  Comments:  Refer to PT  Orders:  -     MRI Spine Cervical-Thoracic-Lumbar Without Contrast (XPD); Future; Expected date: 09/22/2023  -     Ambulatory referral/consult to Physical/Occupational Therapy; Future; Expected date: 09/29/2023  -     Ambulatory referral/consult to Physical/Occupational Therapy; Future; Expected date: 09/29/2023    4. Rosacea  Comments:  Refer to derm  Orders:  -     Ambulatory referral/consult to Dermatology; Future; Expected date: 09/29/2023    5. Anxiety  Comments:  Refer to psych for therapy  Orders:  -     Ambulatory referral/consult to Psychiatry; Future; Expected date: 09/29/2023    6. Exposure to sexually transmitted disease (STD)  Comments:  Will obtain STD panel  Orders:  -     RPR; Future; Expected date: 09/22/2023  -     HIV 1/2 Ag/Ab (4th Gen); Future; Expected date: 09/22/2023  -     C. trachomatis/N. gonorrhoeae by AMP DNA [facility]; Urine    7. Mass of right breast, unspecified quadrant  Comments:  Advised patient that most breast lumps in menstruating women are benign and resolve within a few months. Will continue to monitor               Follow up in about 1 year (around 9/22/2024) for physical exam.

## 2023-09-25 LAB
FINAL PATHOLOGIC DIAGNOSIS: NORMAL
HPV HR 12 DNA SPEC QL NAA+PROBE: POSITIVE
HPV16 AG SPEC QL: NEGATIVE
HPV18 DNA SPEC QL NAA+PROBE: NEGATIVE
Lab: NORMAL

## 2023-12-05 ENCOUNTER — PATIENT MESSAGE (OUTPATIENT)
Dept: PSYCHIATRY | Facility: CLINIC | Age: 28
End: 2023-12-05
Payer: COMMERCIAL

## 2023-12-05 ENCOUNTER — OFFICE VISIT (OUTPATIENT)
Dept: URGENT CARE | Facility: CLINIC | Age: 28
End: 2023-12-05
Payer: COMMERCIAL

## 2023-12-05 ENCOUNTER — PATIENT MESSAGE (OUTPATIENT)
Dept: FAMILY MEDICINE | Facility: CLINIC | Age: 28
End: 2023-12-05
Payer: COMMERCIAL

## 2023-12-05 ENCOUNTER — LAB VISIT (OUTPATIENT)
Dept: LAB | Facility: HOSPITAL | Age: 28
End: 2023-12-05
Attending: INTERNAL MEDICINE
Payer: COMMERCIAL

## 2023-12-05 VITALS
HEIGHT: 66 IN | SYSTOLIC BLOOD PRESSURE: 101 MMHG | DIASTOLIC BLOOD PRESSURE: 68 MMHG | OXYGEN SATURATION: 99 % | RESPIRATION RATE: 18 BRPM | BODY MASS INDEX: 20.57 KG/M2 | WEIGHT: 128 LBS | HEART RATE: 90 BPM | TEMPERATURE: 98 F

## 2023-12-05 DIAGNOSIS — Z00.00 ANNUAL PHYSICAL EXAM: ICD-10-CM

## 2023-12-05 DIAGNOSIS — U07.1 COVID-19: Primary | ICD-10-CM

## 2023-12-05 DIAGNOSIS — Z20.2 EXPOSURE TO SEXUALLY TRANSMITTED DISEASE (STD): ICD-10-CM

## 2023-12-05 DIAGNOSIS — J02.9 SORE THROAT: ICD-10-CM

## 2023-12-05 LAB
ALBUMIN SERPL BCP-MCNC: 4.2 G/DL (ref 3.5–5.2)
ALP SERPL-CCNC: 73 U/L (ref 55–135)
ALT SERPL W/O P-5'-P-CCNC: 17 U/L (ref 10–44)
ANION GAP SERPL CALC-SCNC: 11 MMOL/L (ref 8–16)
AST SERPL-CCNC: 16 U/L (ref 10–40)
BASOPHILS # BLD AUTO: 0.03 K/UL (ref 0–0.2)
BASOPHILS NFR BLD: 0.6 % (ref 0–1.9)
BILIRUB SERPL-MCNC: 0.3 MG/DL (ref 0.1–1)
BUN SERPL-MCNC: 11 MG/DL (ref 6–20)
CALCIUM SERPL-MCNC: 9.2 MG/DL (ref 8.7–10.5)
CHLORIDE SERPL-SCNC: 106 MMOL/L (ref 95–110)
CHOLEST SERPL-MCNC: 190 MG/DL (ref 120–199)
CHOLEST/HDLC SERPL: 4.4 {RATIO} (ref 2–5)
CO2 SERPL-SCNC: 25 MMOL/L (ref 23–29)
CREAT SERPL-MCNC: 0.8 MG/DL (ref 0.5–1.4)
CTP QC/QA: YES
CTP QC/QA: YES
DIFFERENTIAL METHOD: NORMAL
EOSINOPHIL # BLD AUTO: 0.1 K/UL (ref 0–0.5)
EOSINOPHIL NFR BLD: 1.3 % (ref 0–8)
ERYTHROCYTE [DISTWIDTH] IN BLOOD BY AUTOMATED COUNT: 12.5 % (ref 11.5–14.5)
EST. GFR  (NO RACE VARIABLE): >60 ML/MIN/1.73 M^2
ESTIMATED AVG GLUCOSE: 100 MG/DL (ref 68–131)
GLUCOSE SERPL-MCNC: 96 MG/DL (ref 70–110)
HBA1C MFR BLD: 5.1 % (ref 4–5.6)
HCT VFR BLD AUTO: 43.5 % (ref 37–48.5)
HDLC SERPL-MCNC: 43 MG/DL (ref 40–75)
HDLC SERPL: 22.6 % (ref 20–50)
HGB BLD-MCNC: 14 G/DL (ref 12–16)
HIV 1+2 AB+HIV1 P24 AG SERPL QL IA: NORMAL
IMM GRANULOCYTES # BLD AUTO: 0 K/UL (ref 0–0.04)
IMM GRANULOCYTES NFR BLD AUTO: 0 % (ref 0–0.5)
LDLC SERPL CALC-MCNC: 129 MG/DL (ref 63–159)
LYMPHOCYTES # BLD AUTO: 1.8 K/UL (ref 1–4.8)
LYMPHOCYTES NFR BLD: 39 % (ref 18–48)
MCH RBC QN AUTO: 27.9 PG (ref 27–31)
MCHC RBC AUTO-ENTMCNC: 32.2 G/DL (ref 32–36)
MCV RBC AUTO: 87 FL (ref 82–98)
MONOCYTES # BLD AUTO: 0.4 K/UL (ref 0.3–1)
MONOCYTES NFR BLD: 8.7 % (ref 4–15)
NEUTROPHILS # BLD AUTO: 2.4 K/UL (ref 1.8–7.7)
NEUTROPHILS NFR BLD: 50.4 % (ref 38–73)
NONHDLC SERPL-MCNC: 147 MG/DL
NRBC BLD-RTO: 0 /100 WBC
PLATELET # BLD AUTO: 281 K/UL (ref 150–450)
PMV BLD AUTO: 10.9 FL (ref 9.2–12.9)
POC MOLECULAR INFLUENZA A AGN: NEGATIVE
POC MOLECULAR INFLUENZA B AGN: NEGATIVE
POTASSIUM SERPL-SCNC: 3.7 MMOL/L (ref 3.5–5.1)
PROT SERPL-MCNC: 8.6 G/DL (ref 6–8.4)
RBC # BLD AUTO: 5.01 M/UL (ref 4–5.4)
SARS-COV-2 AG RESP QL IA.RAPID: POSITIVE
SODIUM SERPL-SCNC: 142 MMOL/L (ref 136–145)
TRIGL SERPL-MCNC: 90 MG/DL (ref 30–150)
TSH SERPL DL<=0.005 MIU/L-ACNC: 0.72 UIU/ML (ref 0.4–4)
WBC # BLD AUTO: 4.69 K/UL (ref 3.9–12.7)

## 2023-12-05 PROCEDURE — 99204 OFFICE O/P NEW MOD 45 MIN: CPT | Mod: S$GLB,,,

## 2023-12-05 PROCEDURE — 87811 SARS CORONAVIRUS 2 ANTIGEN POCT, MANUAL READ: ICD-10-PCS | Mod: QW,S$GLB,,

## 2023-12-05 PROCEDURE — 86592 SYPHILIS TEST NON-TREP QUAL: CPT | Performed by: INTERNAL MEDICINE

## 2023-12-05 PROCEDURE — 80053 COMPREHEN METABOLIC PANEL: CPT | Performed by: INTERNAL MEDICINE

## 2023-12-05 PROCEDURE — 87811 SARS-COV-2 COVID19 W/OPTIC: CPT | Mod: QW,S$GLB,,

## 2023-12-05 PROCEDURE — 84443 ASSAY THYROID STIM HORMONE: CPT | Performed by: INTERNAL MEDICINE

## 2023-12-05 PROCEDURE — 80061 LIPID PANEL: CPT | Performed by: INTERNAL MEDICINE

## 2023-12-05 PROCEDURE — 99204 PR OFFICE/OUTPT VISIT, NEW, LEVL IV, 45-59 MIN: ICD-10-PCS | Mod: S$GLB,,,

## 2023-12-05 PROCEDURE — 83036 HEMOGLOBIN GLYCOSYLATED A1C: CPT | Performed by: INTERNAL MEDICINE

## 2023-12-05 PROCEDURE — 36415 COLL VENOUS BLD VENIPUNCTURE: CPT | Performed by: INTERNAL MEDICINE

## 2023-12-05 PROCEDURE — 87389 HIV-1 AG W/HIV-1&-2 AB AG IA: CPT | Performed by: INTERNAL MEDICINE

## 2023-12-05 PROCEDURE — 87502 POCT INFLUENZA A/B MOLECULAR: ICD-10-PCS | Mod: QW,S$GLB,,

## 2023-12-05 PROCEDURE — 85025 COMPLETE CBC W/AUTO DIFF WBC: CPT | Performed by: INTERNAL MEDICINE

## 2023-12-05 PROCEDURE — 87502 INFLUENZA DNA AMP PROBE: CPT | Mod: QW,S$GLB,,

## 2023-12-05 RX ORDER — PROMETHAZINE HYDROCHLORIDE AND DEXTROMETHORPHAN HYDROBROMIDE 6.25; 15 MG/5ML; MG/5ML
5 SYRUP ORAL NIGHTLY PRN
Qty: 118 ML | Refills: 0 | Status: SHIPPED | OUTPATIENT
Start: 2023-12-05

## 2023-12-05 NOTE — PROGRESS NOTES
Psychiatry Initial Visit (PhD/LCSW)    Diagnostic Interview - CPT 66485    Visit Type: Telehealth    Due to the nature of this visit type, a virtual visit with synchronous audio and video, each patient to whom this provider administers behavioral health services by telemedicine is: (1) informed of the relationship between the provider and patient and the respective role of any other health care provider with respect to management of the patient; and (2) notified that he or she may decline to receive services by telemedicine and may withdraw from such care at any time. If technological issues occur, at the professional discretion of the clinical provider, synchronous audio only services may be utilized after unsuccessful attempt(s) to connect via audiovisual services; similarly, if audio only visit occurs, patient's verbal consent will be obtained prior to receipt of service. Prevailing clinical standards of care are upheld despite service methodology; having said this, if the clinical provider is unable to meet the prevailing standards of care, the patient will be rescheduled for the provider's soonest availability - as clinically appropriate.     The patient was informed of the following:     Provider's contact info:  [facility]  [address] [place] [address], 3rd Floor, [address]  [place] [address]  (Phone) [phone]    If technology issues occur, call office phone: Ph: [phone]  If crisis: Dial 911 or go to nearest Emergency Room (ER)  If questions related to privacy practices: contact [facility] Information Department: [phone]    For security purposes, the pt identified that they were at [address]  [place] [address] during today's session and contact number is [phone].    The pt's emergency contact(s) is No emergency contact information on file..    Crisis Disclaimer: Patient was informed that due to the virtual nature of the visit, that  if a crisis develops, protocols will be implemented to ensure patient safety, including but not limited to: 1) Initiating a welfare check with local law enforcement and/or 2) Calling 911    Date: 12/11/2023    Site: [facility]  Referral source: PCP  Outpatient Psychiatric Provider: None Noted/Reported  Primary care provider: Livier Spencer DO  Clinical status of patient: Outpatient  MRN: [id number]    Mariela Kwok, a 28 y.o. female, for initial evaluation visit. Met with patient.    Preferred Name: Mariela   Transgender Identity Form    Gender Identity Form  Patient Pronouns: she/her/hers  Transition Summary         Information included in this assessment was obtained through face to face interview (via telehealth or in person) with the patient, records within the [facility] EMR system available to this provider at the time/date of this assessment, and through collateral sources present during the assessment interview. Excerpts of encounter notes may be denoted in the following assessment as supplement to the assessment - unless otherwise cited by this provider, see Epic EMR system for full encounter notes/evaluations.     Evaluation and treatment is based on information presented to date. Any information presented after the completion date of this assessment may affect assessment relevancy/applicability and findings.     Subjective:     Chief complaint/reason for encounter: Establish with provider for psychiatric medication management and/or therapy.    Current symptoms:   Depression: None Noted/Pt Denied  Anxiety: Excessive Worry/Concern, Restlessness (External or Internal), Easily Irritable, Poor Self-Image/Low Confidence, and Racing Thoughts/Perseveration  ADHD/ADD Related: Distractibility/Inattentiveness, Restless/Fidgety, Disorganization, Procrastination, Task Incompletion, Low Stress Tolerance, Low Self-Esteem/Self-Concept, and Increased Energy  Trauma/PTSD Related: None Noted/Pt Denied  Jessica: None Noted/Pt  "Denied  Psychosis: None Noted/Pt Denied    History of Present Illness:     Pt was referred to outpatient psychiatry by their PCP - Dr. Livier Spencer ([facility]). Pt reported "I've been trying to get therapy for close to two years to work on my confidence and anxiety - sometimes I worry about things too much." Pt reported her issues with confidence and anxiety for more than 10yrs - she noted experiencing bullying while in elementary and middle school years. 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 Pt reported experiencing breakthrough depression; however, pt reported "it's only for a little while and it's only every now and then." Pt reported struggling with anhedonia, distractibility/inattentiveness, restless/fidgety, disorganization, procrastination, task incompletion, increased energy, low stress tolerance, and low self-esteem/self-concept. Pt endorsed a positive appetite/sleep cycle.     Per Excerpt(s) - Dr. Livier Spencer's referral encounter note (dated: 09/22/2023 - see Epic EMR for full encounter note/evaluation):  "Patient ID: Mariela Kwok is a 27 y.o. female.     Chief Complaint:annual physical exam        HPI  Patient presents for annual physical exam. She recently moved here from [place]. She reports she has been having constipation. She also reports rosacea and requests a derm referral. She reports she was diagnosed with scoliosis many years ago and requests PT and Ortho referrals. She also reports a right breast lump that she recently noticed. She also reports anxiety after her move and would like " "therapy  ...  Assessment and Plan   1. Annual physical exam  Comments:  Will obtain CBC, CMP, TSH, A1c and Lipid panel  Orders:  -     CBC Auto Differential; Future; Expected date: 09/22/2023  -     Comprehensive Metabolic Panel; Future; Expected date: 09/22/2023  -     Hemoglobin A1C; Future; Expected date: 09/22/2023  -     Lipid Panel; Future; Expected date: 09/22/2023  -     TSH; Future; Expected date: 09/22/2023     2. Adolescent idiopathic scoliosis of thoracolumbar region  Comments:  Will refer to ortho  Orders:  -     Ambulatory referral/consult to Orthopedics; Future; Expected date: 09/29/2023     3. Scoliosis, unspecified scoliosis type, unspecified spinal region  Comments:  Refer to PT  Orders:  -     MRI Spine Cervical-Thoracic-Lumbar Without Contrast (XPD); Future; Expected date: 09/22/2023  -     Ambulatory referral/consult to Physical/Occupational Therapy; Future; Expected date: 09/29/2023  -     Ambulatory referral/consult to Physical/Occupational Therapy; Future; Expected date: 09/29/2023     4. Rosacea  Comments:  Refer to derm  Orders:  -     Ambulatory referral/consult to Dermatology; Future; Expected date: 09/29/2023     5. Anxiety  Comments:  Refer to psych for therapy  Orders:  -     Ambulatory referral/consult to Psychiatry; Future; Expected date: 09/29/2023     6. Exposure to sexually transmitted disease (STD)  Comments:  Will obtain STD panel  Orders:  -     RPR; Future; Expected date: 09/22/2023  -     HIV 1/2 Ag/Ab (4th Gen); Future; Expected date: 09/22/2023  -     C. trachomatis/N. gonorrhoeae by AMP DNA [facility]; Urine     7. Mass of right breast, unspecified quadrant  Comments:  Advised patient that most breast lumps in menstruating women are benign and resolve within a few months. Will continue to monitor         Follow up in about 1 year (around 9/22/2024) for physical exam."      Historical Psychiatric Diagnoses (per [facility] EMR/pt report; current and/or historical):  Generalized " "Anxiety Disorder    SI/HI and AVH/D (per Epic EMR/pt report; current and/or historical):   SI (per Epic EMR/pt report; current and/or historical): Pt Denied  Attempts (per Epic EMR/pt report; current and/or historical): Pt Denied  HI/Violence (per Epic EMR/pt report; current and/or historical): Pt Denied  Jessica/AVH/D (per Epic EMR/pt report; current and/or historical): Pt Denied    Self-Harm (per Epic EMR/pt report; current and/or historical): Pt Denied    Outpatient Therapy (per Epic EMR/pt report; current and/or historical):  Pt reported she met with a therapist (could not recall name of provider) in [place] once; however, she noted "the 2020 (covid) pandemic happened so I had to stop going."     Outpatient Psychiatric Med Management (per Epic EMR/pt report; current and/or historical): Pt Denied    Psychiatric Meds (per Epic EMR/pt report; current and/or historical): Pt Denied/None Noted    Psychiatric Hospitalizations (per Epic EMR/pt report; current and/or historical): Pt Denied    Intensive Outpatient Program (per Epic EMR/pt report; current and/or historical): Pt Denied    Substance Use/Abuse (current and/or historical):   Caffeine: Pt Denied/None Noted  Vaping: Pt Denied/None Noted  Tobacco/Nicotine: Pt Denied/None Noted  Alcohol: Pt Denied/None Noted  Marijuana: Pt Denied/None Noted  Other: Pt Denied/None Noted    Substance Abuse Rehabilitation (per Epic EMR/pt report; current and/or historical): Pt Denied    Occupation: [employer]st ([employer]; 2yrs)     Education Level: High School Diploma: ([employer] - [place]) and Bachelor's Degree: Forensic Psychology ([employer] - [place])    Episcopal / Spiritual: None    Residential: Lives alone    Marital Status: Never     Family:      Pt reported she was born in [place] and immigrated with her parents to [place] when she was 5yo. 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 Pt reported having a positive, close relationship with her parents. Pt reported, relative to her extended family, having minimal relationships with them due to their geographic distance from her. Pt reported she and her parents moved to [place] from [place] in  for her job. Pt noted "the local [employer] in [place] closed the shop during the ( Covid) Pandemic so they just decided to move to [place] with me and it worked out."     Born: [place]  Reared: [place]    Parent's Marital Status:  (30yrs)  Mother's Occupation:  for Family Business  Father's Occupation: [employer]   Mother Relationship Quality: Intact/Positive  Father Relationship Quality: Intact/Positive  Mother ?: Pt Denied  Father ?:Pt Denied    Siblings (biological, half, step, and/or adopted):    Biological: Pt Denied   Paternal Half: Pt Denied   Maternal Half: Pt Denied   Maternal Stepsiblings: Pt Denied   Paternal Stepsiblings: Pt Denied   Adopted: Pt Denied    Children & Ages:    Daughters: Pt Denied   Sons: Pt Denied   Stepdaughters: Pt Denied   Stepsons: Pt Denied      Family history of psychiatric illness/substance abuse:   Father: Pt Denied  Mother: Pt Denied  Sibling(s): Pt Denied  Maternal Grandmother: Pt Denied  Maternal Grandfather: Pt Denied  Paternal Grandmother: Pt Denied  Paternal Grandfather: Pt Denied  Child(adela): Not Applicable    Trauma/Abuse/Neglect:   Physical: Pt Denied/None Noted  Verbal: Pt Denied/None Noted  Sexual: Pt Denied/None Noted  Other Trauma: Pt Denied/None Noted    Legal/Criminal Hx: Pt Denied/None Noted        2023     8:04 AM   GAD7   1. Feeling nervous, anxious, or " on edge? 0   2. Not being able to stop or control worrying? 0   3. Worrying too much about different things? 1   4. Trouble relaxing? 0   5. Being so restless that it is hard to sit still? 0   6. Becoming easily annoyed or irritable? 0   7. Feeling afraid as if something awful might happen? 0   CR-7 Score 1     0-4 = Minimal anxiety  5-9 = Mild anxiety  10-14 = Moderate anxiety  15-21 = Severe anxiety         12/11/2023     8:05 AM   PHQ-9 Depression Patient Health Questionnaire   Patient agreed to terms: Yes   Little interest or pleasure in doing things 1   Feeling down, depressed, or hopeless 0   Trouble falling or staying asleep, or sleeping too much 1   Feeling tired or having little energy 0   Poor appetite or overeating 0   Feeling bad about yourself - or that you are a failure or have let yourself or your family down 0   Trouble concentrating on things, such as reading the newspaper or watching television 0   Moving or speaking so slowly that other people could have noticed. Or the opposite - being so fidgety or restless that you have been moving around a lot more than usual 0   Thoughts that you would be better off dead, or of hurting yourself in some way 0   PHQ-9 Total Score 2   If you checked off any problems, how difficult have these problems made it for you to do your work, take care of things at home, or get along with other people? Not difficult at all   Interpretation Minimal or None     0-4 = No intervention  5 to 9 = Mild  10 to 14 = Moderate  15 to 19 = Moderately severe  ?20 = Severe      Current medications and drug reactions (include OTC, herbal):   Outpatient Encounter Medications as of 12/11/2023   Medication Sig Dispense Refill    promethazine-dextromethorphan (PROMETHAZINE-DM) 6.25-15 mg/5 mL Syrp Take 5 mLs by mouth nightly as needed (for cough). 118 mL 0     No facility-administered encounter medications on file as of 12/11/2023.          Objective - Current Evaluation:     Mental Status  Evaluation  Appearance: unremarkable, age appropriate  Behavior: normal, cooperative  Speech: normal tone, normal rate, normal pitch, normal volume  Mood: euthymic  Affect: congruent and appropriate  Thought Process: normal and logical  Thought Content: normal, no suicidality, no homicidality, delusions, or paranoia  Sensorium: grossly intact  Cognition: grossly intact  Insight: intact  Judgment: adequate to circumstances    Strengths and liabilities: Strength: Patient accepts guidance/feedback, Strength: Patient is expressive/articulate, Strength: Patient is intelligent, Strength: Patient is motivated for change, Strength: Patient has positive support network, Strength: Patient has reasonable judgment, and Liability: Patient lacks coping skills      Diagnostic Impression - Plan:     1. Anxiety    2. Attention and concentration deficit        Plan:individual psychotherapy  Pt denied desire to initiate psychiatric medication management appt with psychiatrist at this time    Return to Clinic: 2 weeks  Pt Reported to Schedule Self via Epic EMR MyChart Application and/or Department Support Staff         Maria Luz Amezcua LCSW  12/11/2023  8:00 AM

## 2023-12-05 NOTE — PROGRESS NOTES
"Subjective:      Patient ID: Mariela Kwok is a 28 y.o. female.    Vitals:  height is 5' 6" (1.676 m) and weight is 58.1 kg (128 lb). Her temperature is 98.4 °F (36.9 °C). Her blood pressure is 101/68 and her pulse is 90. Her respiration is 18 and oxygen saturation is 99%.     Chief Complaint: Sore Throat    Mariela Kwok is a 28 y.o. female who presents for sore throat which onset 4 days ago. Associated sxs include cough, congestion, HA, fever (Tmax 102 F), fatigue, chills. Patient denies any fever, SOB, CP, abd pain, n/v/d, rash, dizziness, or numbness/tingling. Prior Tx includes tylenol.    Sore Throat   This is a new problem. Episode onset: friday. The problem has been gradually worsening. The maximum temperature recorded prior to her arrival was 102 - 102.9 F. The pain is at a severity of 6/10. The pain is mild. Associated symptoms include congestion, coughing, headaches and a hoarse voice. Pertinent negatives include no abdominal pain, diarrhea, drooling, ear discharge, ear pain, plugged ear sensation, neck pain, shortness of breath, stridor, swollen glands, trouble swallowing or vomiting. She has had no exposure to strep or mono. She has tried nothing for the symptoms. The treatment provided no relief.       Constitution: Negative for appetite change, chills, sweating, fatigue and fever.   HENT:  Positive for congestion and sore throat. Negative for ear pain, ear discharge, hearing loss, drooling, postnasal drip, sinus pain, sinus pressure and trouble swallowing.    Neck: Negative for neck pain.   Cardiovascular:  Negative for chest pain.   Respiratory:  Positive for cough. Negative for sputum production, shortness of breath and stridor.    Gastrointestinal:  Negative for abdominal pain, nausea, vomiting and diarrhea.   Musculoskeletal:  Negative for muscle ache.   Neurological:  Positive for headaches. Negative for dizziness, numbness and tingling.      Objective:     Physical Exam   Constitutional: She is " oriented to person, place, and time. She appears well-developed. She is cooperative.  Non-toxic appearance. She does not appear ill. No distress.   HENT:   Head: Normocephalic and atraumatic.   Ears:   Right Ear: Hearing, tympanic membrane, external ear and ear canal normal.   Left Ear: Hearing, tympanic membrane, external ear and ear canal normal.   Nose: Nose normal. No mucosal edema or nasal deformity. No epistaxis. Right sinus exhibits no maxillary sinus tenderness and no frontal sinus tenderness. Left sinus exhibits no maxillary sinus tenderness and no frontal sinus tenderness.   Mouth/Throat: Uvula is midline, oropharynx is clear and moist and mucous membranes are normal. Mucous membranes are moist. No trismus in the jaw. Normal dentition. No uvula swelling. No oropharyngeal exudate, posterior oropharyngeal edema or posterior oropharyngeal erythema.   Eyes: Conjunctivae and lids are normal. No scleral icterus. Extraocular movement intact   Neck: Trachea normal and phonation normal. Neck supple. No edema present. No erythema present. No neck rigidity present.   Cardiovascular: Normal rate, regular rhythm, normal heart sounds and normal pulses.   Pulmonary/Chest: Effort normal and breath sounds normal. No stridor. No respiratory distress. She has no decreased breath sounds. She has no wheezes. She has no rhonchi. She has no rales.   Abdominal: Normal appearance.   Musculoskeletal: Normal range of motion.         General: No deformity. Normal range of motion.   Neurological: She is alert and oriented to person, place, and time. She exhibits normal muscle tone. Coordination normal.   Skin: Skin is warm, dry, intact, not diaphoretic and not pale.   Psychiatric: Her speech is normal and behavior is normal. Judgment and thought content normal.   Nursing note and vitals reviewed.      Assessment:     1. COVID-19    2. Sore throat        Results for orders placed or performed in visit on 12/05/23   POCT Influenza A/B  MOLECULAR   Result Value Ref Range    POC Molecular Influenza A Ag Negative Negative, Not Reported    POC Molecular Influenza B Ag Negative Negative, Not Reported     Acceptable Yes    SARS Coronavirus 2 Antigen, POCT Manual Read   Result Value Ref Range    SARS Coronavirus 2 Antigen Positive (A) Negative     Acceptable Yes        Plan:       COVID-19    Sore throat  -     POCT Influenza A/B MOLECULAR  -     SARS Coronavirus 2 Antigen, POCT Manual Read    Other orders  -     promethazine-dextromethorphan (PROMETHAZINE-DM) 6.25-15 mg/5 mL Syrp; Take 5 mLs by mouth nightly as needed (for cough).  Dispense: 118 mL; Refill: 0        VSS. Afebrile. Patient is in NAD.  COVID-19 Risk Score = 0  Chart reviewed.  Isolation precautions given. Discussed 5 day isolation period from first (+) test. Patient verbalizes understanding.    Discussed risk and benefits of Paxlovid therapy. Paxlovid offered. However, patient does not meet qualifications to begin antiviral therapy.     Discussed positive COVID-19 results with patient. Advised patient to begin antiviral therapy. Get plenty of rest and drink plenty of fluids. Advised patient to return here or go to the Emergency Department for any concerns or worsening of condition. Advised patient to take tylenol (acetominophen) as permitted for fever, chills or body aches every 4 hours but not to exceed 4000 mg/day. Patient has no questions or concerns at this time, all questions were answered before discharge. Patient given handout with discharge instructions.  ER precautions given such as persistent fever, SOB, or CP.

## 2023-12-05 NOTE — LETTER
"Mariela Kwok (Andra) was seen and treated in our Urgent Care on 12/5/2023.     COVID-19 is present in our communities across the state. There is limited testing for COVID at this time, so not all patients can be tested. In this situation, your employee meets the following criteria:    Mariela Kwok has met the criteria for COVID-19 testing and has a POSITIVE result. She can return to work once they are asymptomatic for 24 hours without the use of fever reducing medications AND at least five days from the first positive result. A mask is recommended for 5 days post quarantine.     If you have any questions or concerns, or if I can be of further assistance, please do not hesitate to contact me.    Sincerely,     Ginny Coulter PA-C  "

## 2023-12-05 NOTE — PATIENT INSTRUCTIONS
Your test was POSITIVE for COVID-19 (coronavirus).       Please isolate yourself at home.  You may leave home and/or return to work once the following conditions are met:    If you were not hospitalized and are not moderately to severely immunocompromised:   More than 5 days since symptoms first appeared AND  More than 24 hours fever free without medications AND  Symptoms are improving  Continue to wear a mask around others for 5 additional days.    If you were hospitalized OR are moderately to severely immunocompromised:  More than 20 days since symptoms first appeared  More than 24 hours fever free without medications  Symptoms have improved    If you had no symptoms but tested positive:  More than 5 days since the date of the first positive test (20 days if moderately to severely immunocompromised). If you develop symptoms, then use the guidelines above.  Continue to wear a mask around others for 5 additional days.      Contact Tracing    As one of the next steps, you will receive a call or text from the [place] Department of Health ([facility]) COVID-19 Tracing Team. See the contact information below so you know not to ignore the health departments call. It is important that you contact them back immediately so they can help.      Contact Tracer Number:  [phone]  Caller ID for most carriers: [facility]     What is contact tracing?  Contact tracing is a process that helps identify everyone who has been in close contact with an infected person. Contact tracers let those people know they may have been exposed and guide them on next steps. Confidentiality is important for everyone; no one will be told who may have exposed them to the virus.  Your involvement is important. The more we know about where and how this virus is spreading, the better chance we have at stopping it from spreading further.  What does exposure mean?  Exposure means you have been within 6 feet for more than 15 minutes with a person who  has or had COVID-19.  What kind of questions do the contact tracers ask?  A contact tracer will confirm your basic contact information including name, address, phone number, and next of kin, as well as asking about any symptoms you may have had. Theyll also ask you how you think you may have gotten sick, such as places where you may have been exposed to the virus, and people you were with. Those names will never be shared with anyone outside of that call, and will only be used to help trace and stop the spread of the virus.   I have privacy concerns. How will the state use my information?  Your privacy about your health is important. All calls are completed using call centers that use the appropriate health privacy protection measures (HIPAA compliance), meaning that your patient information is safe. No one will ever ask you any questions related to immigration status. Your health comes first.   Do I have to participate?  You do not have to participate, but we strongly encourage you to. Contact tracing can help us catch and control new outbreaks as theyre developing to keep your friends and family safe.   What if I dont hear from anyone?  If you dont receive a call within 24 hours, you can call the number above right away to inquire about your status. That line is open from 8:00 am - 8:00 p.m., 7 days a week.  Contact tracing saves lives! Together, we have the power to beat this virus and keep our loved ones and neighbors safe.    For more information see CDC link below.      https://www.cdc.gov/coronavirus/2019-ncov/hcp/guidance-prevent-spread.html#precautions        Sources:  [facility]         PLEASE READ YOUR DISCHARGE INSTRUCTIONS ENTIRELY AS IT CONTAINS IMPORTANT INFORMATION.    Please drink plenty of fluids.    Please get plenty of rest.    Please return here or go to the Emergency Department for any concerns or worsening of condition.    Please take an over the  counter antihistamine medication (Allegra/Claritin/Zyrtec) of your choice as directed for allergy symptoms and/or runny nose and postnasal drip.    Try an over the counter decongestant for sinus pressure/ear pressure, congestion symptoms like Mucinex D or Sudafed or Phenylephrine. You buy this behind the pharmacy counter.    If you do have Hypertension or palpitations, it is safe to take Coricidin HBP for relief of sinus symptoms.    Tylenol or ibuprofen can also be used as directed for pain and fever unless you have an allergy to them or medical condition such as stomach ulcers, kidney or liver disease or blood thinners etc for which you should not be taking these type of medications.     Sore throat recommendations: Warm fluids, warm salt water gargles, throat lozenges, tea, honey, soup, rest, hydration.    Use over the counter Flonase or Nasocort: one spray each nostril twice daily OR two sprays each nostril once daily until nares dry out, unless you have Glaucoma.   Can also supplement with nasal saline rinse.    Sinus rinses DO NOT USE TAP WATER, if you must, water must be at a rolling boil for 1 minute, let it cool, then use.  May use distilled water, or over the counter nasal saline rinses.  Clemente's vapor rub in shower to help open nasal passages.  May use nasal gel to keep passages moisturized.  May use nasal saline sprays during the day for added relief of congestion.   For those who go to the gym, please do not use the sauna or steam room now to clear sinuses.    Cough     Rest and fluids are important  Can use honey with armando to soothe your throat    Robitussin or Delsyum for cough suppressant for dry cough.    Mucinex DM or products containing Guaifenesin or Dextromethorphan for expectorant (wet cough).    Take prescription cough meds (pills) as prescribed; take prescription cough syrup at night as needed for cough.  Do not take both the prescribed cough pills and syrup at the same time or within 6  hours of each other.  Do not take the cough syrup with any other sedative medication as it can can cause drowsiness. Do not operate any heavy machinery, drink or drive while taking the cough syrup.     Please follow up with your primary care doctor or specialist in the next 48-72hrs as needed and if no improvement    If you smoke, please stop smoking.    Please return or see your primary care doctor if you develop new or worsening symptoms.     Lastly, good hand washing and cough hygiene (cough into your elbow) will help prevent the spread of the illness. A general rule is that you are no longer contagious once you have been without a fever for over 24 hours without requiring fever reducing medications.     Please arrange follow up with your primary medical clinic as soon as possible. You must understand that you've received an Urgent Care treatment only and that you may be released before all of your medical problems are known or treated. You, the patient, will arrange for follow up as instructed. If your symptoms worsen or fail to improve you should go to the Emergency Room.

## 2023-12-06 LAB — RPR SER QL: NORMAL

## 2023-12-08 ENCOUNTER — TELEPHONE (OUTPATIENT)
Dept: PSYCHIATRY | Facility: CLINIC | Age: 28
End: 2023-12-08
Payer: COMMERCIAL

## 2023-12-11 ENCOUNTER — PATIENT MESSAGE (OUTPATIENT)
Dept: RESEARCH | Facility: HOSPITAL | Age: 28
End: 2023-12-11
Payer: COMMERCIAL

## 2023-12-11 ENCOUNTER — PATIENT MESSAGE (OUTPATIENT)
Dept: PSYCHIATRY | Facility: CLINIC | Age: 28
End: 2023-12-11

## 2023-12-11 ENCOUNTER — OFFICE VISIT (OUTPATIENT)
Dept: PSYCHIATRY | Facility: CLINIC | Age: 28
End: 2023-12-11
Payer: COMMERCIAL

## 2023-12-11 DIAGNOSIS — F41.9 ANXIETY: Primary | ICD-10-CM

## 2023-12-11 DIAGNOSIS — R41.840 ATTENTION AND CONCENTRATION DEFICIT: ICD-10-CM

## 2023-12-11 PROCEDURE — 90791 PSYCH DIAGNOSTIC EVALUATION: CPT | Mod: 95,,, | Performed by: SOCIAL WORKER

## 2023-12-11 PROCEDURE — 3044F HG A1C LEVEL LT 7.0%: CPT | Mod: CPTII,95,, | Performed by: SOCIAL WORKER

## 2023-12-11 PROCEDURE — 3044F PR MOST RECENT HEMOGLOBIN A1C LEVEL <7.0%: ICD-10-PCS | Mod: CPTII,95,, | Performed by: SOCIAL WORKER

## 2023-12-11 PROCEDURE — 90791 PR PSYCHIATRIC DIAGNOSTIC EVALUATION: ICD-10-PCS | Mod: 95,,, | Performed by: SOCIAL WORKER

## 2023-12-20 ENCOUNTER — PATIENT MESSAGE (OUTPATIENT)
Dept: PSYCHIATRY | Facility: CLINIC | Age: 28
End: 2023-12-20
Payer: COMMERCIAL
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
## 2024-02-21 ENCOUNTER — OFFICE VISIT (OUTPATIENT)
Dept: FAMILY MEDICINE | Facility: CLINIC | Age: 29
End: 2024-02-21
Payer: COMMERCIAL

## 2024-02-21 DIAGNOSIS — F41.9 ANXIETY: ICD-10-CM

## 2024-02-21 DIAGNOSIS — R10.12 LEFT UPPER QUADRANT ABDOMINAL PAIN: Primary | ICD-10-CM

## 2024-02-21 DIAGNOSIS — L71.9 ROSACEA: ICD-10-CM

## 2024-02-21 PROCEDURE — 99213 OFFICE O/P EST LOW 20 MIN: CPT | Mod: 95,,, | Performed by: INTERNAL MEDICINE

## 2024-02-21 NOTE — PROGRESS NOTES
Subjective     Patient ID: Mariela Kwok is a 28 y.o. female.    Chief Complaint: Abdominal pain  Patient presents via video call due to abdominal pain. She states she get sudden sharp pain in the Left upper quadrant that comes and goes. She also request several referrals to be renewed.    Abdominal Pain  This is a recurrent problem. The current episode started yesterday. The onset quality is sudden. The problem occurs rarely. The most recent episode lasted 3 days. The problem has been waxing and waning. The pain is at a severity of 8/10. The pain is moderate. The quality of the pain is aching, a sensation of fullness and sharp. Associated symptoms include flatus, headaches and nausea. Pertinent negatives include no anorexia, arthralgias, belching, constipation, diarrhea, dysuria, fever, frequency, hematochezia, hematuria, melena, myalgias, vomiting or weight loss. The pain is aggravated by coughing, eating and movement. The pain is relieved by Nothing. She has tried nothing for the symptoms. The treatment provided mild relief. There is no history of abdominal surgery, colon cancer, Crohn's disease, gallstones, GERD, irritable bowel syndrome, pancreatitis, PUD or ulcerative colitis. Patient's medical history does not include kidney stones and UTI.     Review of Systems   Constitutional:  Negative for fever and weight loss.   Gastrointestinal:  Positive for abdominal pain, flatus and nausea. Negative for anorexia, constipation, diarrhea, hematochezia, melena and vomiting.   Genitourinary:  Negative for dysuria, frequency and hematuria.   Musculoskeletal:  Negative for arthralgias and myalgias.   Neurological:  Positive for headaches.          Objective       Current Outpatient Medications:     promethazine-dextromethorphan (PROMETHAZINE-DM) 6.25-15 mg/5 mL Syrp, Take 5 mLs by mouth nightly as needed (for cough)., Disp: 118 mL, Rfl: 0  No current facility-administered medications for this visit.     Physical  Exam  Constitutional:       Appearance: Normal appearance.   Neurological:      General: No focal deficit present.      Mental Status: She is alert and oriented to person, place, and time.   Psychiatric:         Mood and Affect: Mood normal.            Assessment and Plan     1. Left upper quadrant abdominal pain  -     US Abdomen Complete; Future; Expected date: 02/21/2024    2. Rosacea  -     Ambulatory referral/consult to Dermatology; Future; Expected date: 02/28/2024    3. Anxiety  -     Ambulatory referral/consult to Psychiatry; Future; Expected date: 02/28/2024    Will order US of abdomen  Will refer to dermatology  Will refer to psychiatry    The patient location is: work  The chief complaint leading to consultation is: abdominal pain    Visit type: audiovisual    Face to Face time with patient: 10  21 minutes of total time spent on the encounter, which includes face to face time and non-face to face time preparing to see the patient (eg, review of tests), Obtaining and/or reviewing separately obtained history, Documenting clinical information in the electronic or other health record, Independently interpreting results (not separately reported) and communicating results to the patient/family/caregiver, or Care coordination (not separately reported).         Each patient to whom he or she provides medical services by telemedicine is:  (1) informed of the relationship between the physician and patient and the respective role of any other health care provider with respect to management of the patient; and (2) notified that he or she may decline to receive medical services by telemedicine and may withdraw from such care at any time.    Notes:         No follow-ups on file.

## 2024-02-22 ENCOUNTER — PATIENT MESSAGE (OUTPATIENT)
Dept: PSYCHIATRY | Facility: CLINIC | Age: 29
End: 2024-02-22
Payer: COMMERCIAL

## 2024-06-11 ENCOUNTER — OFFICE VISIT (OUTPATIENT)
Dept: DERMATOLOGY | Facility: CLINIC | Age: 29
End: 2024-06-11
Payer: COMMERCIAL

## 2024-06-11 ENCOUNTER — LAB VISIT (OUTPATIENT)
Dept: LAB | Facility: HOSPITAL | Age: 29
End: 2024-06-11
Attending: STUDENT IN AN ORGANIZED HEALTH CARE EDUCATION/TRAINING PROGRAM
Payer: COMMERCIAL

## 2024-06-11 VITALS — HEIGHT: 66 IN | BODY MASS INDEX: 20.58 KG/M2 | WEIGHT: 128.06 LBS

## 2024-06-11 DIAGNOSIS — L71.9 ROSACEA: ICD-10-CM

## 2024-06-11 PROCEDURE — 99999 PR PBB SHADOW E&M-EST. PATIENT-LVL III: CPT | Mod: PBBFAC,,, | Performed by: STUDENT IN AN ORGANIZED HEALTH CARE EDUCATION/TRAINING PROGRAM

## 2024-06-11 PROCEDURE — 1160F RVW MEDS BY RX/DR IN RCRD: CPT | Mod: CPTII,S$GLB,, | Performed by: STUDENT IN AN ORGANIZED HEALTH CARE EDUCATION/TRAINING PROGRAM

## 2024-06-11 PROCEDURE — 86038 ANTINUCLEAR ANTIBODIES: CPT | Performed by: STUDENT IN AN ORGANIZED HEALTH CARE EDUCATION/TRAINING PROGRAM

## 2024-06-11 PROCEDURE — G2211 COMPLEX E/M VISIT ADD ON: HCPCS | Mod: S$GLB,,, | Performed by: STUDENT IN AN ORGANIZED HEALTH CARE EDUCATION/TRAINING PROGRAM

## 2024-06-11 PROCEDURE — 3008F BODY MASS INDEX DOCD: CPT | Mod: CPTII,S$GLB,, | Performed by: STUDENT IN AN ORGANIZED HEALTH CARE EDUCATION/TRAINING PROGRAM

## 2024-06-11 PROCEDURE — 36415 COLL VENOUS BLD VENIPUNCTURE: CPT | Performed by: STUDENT IN AN ORGANIZED HEALTH CARE EDUCATION/TRAINING PROGRAM

## 2024-06-11 PROCEDURE — 99204 OFFICE O/P NEW MOD 45 MIN: CPT | Mod: S$GLB,,, | Performed by: STUDENT IN AN ORGANIZED HEALTH CARE EDUCATION/TRAINING PROGRAM

## 2024-06-11 PROCEDURE — 1159F MED LIST DOCD IN RCRD: CPT | Mod: CPTII,S$GLB,, | Performed by: STUDENT IN AN ORGANIZED HEALTH CARE EDUCATION/TRAINING PROGRAM

## 2024-06-11 RX ORDER — IVERMECTIN 10 MG/G
CREAM TOPICAL
Qty: 45 G | Refills: 2 | Status: SHIPPED | OUTPATIENT
Start: 2024-06-11

## 2024-06-11 NOTE — PROGRESS NOTES
Subjective:       Patient ID:  Mariela Kwok is a 28 y.o. female who presents for   Chief Complaint   Patient presents with    Rash    Dry Skin    Itching     History of Present Illness: The patient presents with chief complaint of rash on the face, possibly rosacea.  Location: face, mostly on the cheeks and forehead  Duration: ongoing for years  Signs/Symptoms: reports having persistent redness on the skin, fine bumps, red bumps, pus bumps, burning and irritation. Worse when hot and sweaty  Prior treatments: has tried metronidazole cream, doxycycline      Rash    Dry Skin    Itching        Review of Systems   Constitutional:  Negative for fever and chills.   Skin:  Positive for itching, rash and dry skin.        Objective:    Physical Exam   Constitutional: She appears well-developed and well-nourished. No distress.   Neurological: She is alert and oriented to person, place, and time. She is not disoriented.   Psychiatric: She has a normal mood and affect.   Skin:   Areas Examined (abnormalities noted in diagram):   Head / Face Inspection Performed  Neck Inspection Performed  RUE Inspected  LUE Inspection Performed              Diagram Legend     Erythematous scaling macule/papule c/w actinic keratosis       Vascular papule c/w angioma      Pigmented verrucoid papule/plaque c/w seborrheic keratosis      Yellow umbilicated papule c/w sebaceous hyperplasia      Irregularly shaped tan macule c/w lentigo     1-2 mm smooth white papules consistent with Milia      Movable subcutaneous cyst with punctum c/w epidermal inclusion cyst      Subcutaneous movable cyst c/w pilar cyst      Firm pink to brown papule c/w dermatofibroma      Pedunculated fleshy papule(s) c/w skin tag(s)      Evenly pigmented macule c/w junctional nevus     Mildly variegated pigmented, slightly irregular-bordered macule c/w mildly atypical nevus      Flesh colored to evenly pigmented papule c/w intradermal nevus       Pink pearly papule/plaque c/w  basal cell carcinoma      Erythematous hyperkeratotic cursted plaque c/w SCC      Surgical scar with no sign of skin cancer recurrence      Open and closed comedones      Inflammatory papules and pustules      Verrucoid papule consistent consistent with wart     Erythematous eczematous patches and plaques     Dystrophic onycholytic nail with subungual debris c/w onychomycosis     Umbilicated papule    Erythematous-base heme-crusted tan verrucoid plaque consistent with inflamed seborrheic keratosis     Erythematous Silvery Scaling Plaque c/w Psoriasis     See annotation      Assessment / Plan:        Rosacea - clinically consistent with above. Discussed treatment options with patient. Agreeable to start topical ivermectin. Will also obtain BETHANY to r/o any possible underlying autoimmune condition also leading to symptoms.  -     ivermectin (SOOLANTRA) 1 % Crea; Apply to the face 2 times daily.  Dispense: 45 g; Refill: 2  -     BETHANY; Future; Expected date: 06/11/2024         Follow up in about 3 months (around 9/11/2024).

## 2024-06-13 LAB — ANA SER QL IF: NORMAL

## 2024-09-18 ENCOUNTER — OFFICE VISIT (OUTPATIENT)
Dept: OBSTETRICS AND GYNECOLOGY | Facility: CLINIC | Age: 29
End: 2024-09-18
Payer: COMMERCIAL

## 2024-09-18 ENCOUNTER — TELEPHONE (OUTPATIENT)
Dept: FAMILY MEDICINE | Facility: CLINIC | Age: 29
End: 2024-09-18
Payer: COMMERCIAL

## 2024-09-18 VITALS
BODY MASS INDEX: 21.64 KG/M2 | HEIGHT: 66 IN | DIASTOLIC BLOOD PRESSURE: 64 MMHG | WEIGHT: 134.69 LBS | SYSTOLIC BLOOD PRESSURE: 96 MMHG

## 2024-09-18 DIAGNOSIS — B37.31 VAGINAL CANDIDIASIS: ICD-10-CM

## 2024-09-18 DIAGNOSIS — Z11.3 SCREEN FOR STD (SEXUALLY TRANSMITTED DISEASE): Primary | ICD-10-CM

## 2024-09-18 DIAGNOSIS — Z32.00 POSSIBLE PREGNANCY: Primary | ICD-10-CM

## 2024-09-18 DIAGNOSIS — Z32.00 POSSIBLE PREGNANCY, NOT YET CONFIRMED: ICD-10-CM

## 2024-09-18 PROCEDURE — 99213 OFFICE O/P EST LOW 20 MIN: CPT | Mod: S$GLB,,,

## 2024-09-18 PROCEDURE — 3074F SYST BP LT 130 MM HG: CPT | Mod: CPTII,S$GLB,,

## 2024-09-18 PROCEDURE — 1160F RVW MEDS BY RX/DR IN RCRD: CPT | Mod: CPTII,S$GLB,,

## 2024-09-18 PROCEDURE — 81025 URINE PREGNANCY TEST: CPT | Mod: S$GLB,,,

## 2024-09-18 PROCEDURE — 3078F DIAST BP <80 MM HG: CPT | Mod: CPTII,S$GLB,,

## 2024-09-18 PROCEDURE — 87591 N.GONORRHOEAE DNA AMP PROB: CPT

## 2024-09-18 PROCEDURE — 99999 PR PBB SHADOW E&M-EST. PATIENT-LVL III: CPT | Mod: PBBFAC,,,

## 2024-09-18 PROCEDURE — 87210 SMEAR WET MOUNT SALINE/INK: CPT | Mod: QW,S$GLB,,

## 2024-09-18 PROCEDURE — 1159F MED LIST DOCD IN RCRD: CPT | Mod: CPTII,S$GLB,,

## 2024-09-18 PROCEDURE — 3008F BODY MASS INDEX DOCD: CPT | Mod: CPTII,S$GLB,,

## 2024-09-18 RX ORDER — FLUCONAZOLE 150 MG/1
150 TABLET ORAL
Qty: 2 TABLET | Refills: 0 | Status: SHIPPED | OUTPATIENT
Start: 2024-09-18 | End: 2024-09-22

## 2024-09-18 NOTE — TELEPHONE ENCOUNTER
Called and spoke with patient. She is requesting vaginal ultrasound for possible pregnancy. She saw gyn today and they ordered hcg but pt had to leave to go back to work.     Pt states she had unprotected intercourse on 9/3. Her cycle had just ended. She is having pelvic pain/sore breast/hunger/fatigue. She did have a neg UPT. She has concerns that it's too early to show up on a UPT. She isn't sure if she could have possibly ovulated that soon after cycle, I explained that it's possible but not likely. I also explained that if she is pregnant it would be too early to see anything on ultrasound. I told her the only way to know this early would be the blood work. Pt voiced understanding and thanked me for taking time to explain these things to her. The hcg was already ordered today so I scheduled her to have it done on Saturday at [facility].

## 2024-09-18 NOTE — LETTER
September 18, 2024      [facility] - OB GYN 2nd Fl  [address] [facility]  [place] [address]  Phone: [phone]  Fax: [phone]       Patient: Mariela Kwok   YOB: 1995  Date of Visit: 09/18/2024    To Whom It May Concern:    Mariela Kwok  was at [facility] on 09/18/2024. The patient may return to work/school on 9/18/2024 with no restrictions. If you have any questions or concerns, or if I can be of further assistance, please do not hesitate to contact me.    Sincerely,    Onelia Ramos NP

## 2024-09-18 NOTE — TELEPHONE ENCOUNTER
----- Message from Yaima Jackson sent at 9/18/2024 10:52 AM CDT -----  Contact: Patient,  [phone]  Calling to request the Abdominal Ultrasound changed to a IntraVaginal Abdominal Ultrasound. Please advise. Thanks.
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
## 2024-09-20 LAB
C TRACH DNA SPEC QL NAA+PROBE: NOT DETECTED
N GONORRHOEA DNA SPEC QL NAA+PROBE: NOT DETECTED

## 2024-09-21 ENCOUNTER — LAB VISIT (OUTPATIENT)
Dept: LAB | Facility: HOSPITAL | Age: 29
End: 2024-09-21
Attending: OBSTETRICS & GYNECOLOGY
Payer: COMMERCIAL

## 2024-09-21 DIAGNOSIS — Z32.00 POSSIBLE PREGNANCY: ICD-10-CM

## 2024-09-21 LAB — HCG INTACT+B SERPL-ACNC: <2.4 MIU/ML

## 2024-09-21 PROCEDURE — 84702 CHORIONIC GONADOTROPIN TEST: CPT | Performed by: OBSTETRICS & GYNECOLOGY

## 2024-09-21 PROCEDURE — 36415 COLL VENOUS BLD VENIPUNCTURE: CPT | Performed by: OBSTETRICS & GYNECOLOGY

## 2025-08-08 ENCOUNTER — OFFICE VISIT (OUTPATIENT)
Dept: OBSTETRICS AND GYNECOLOGY | Facility: CLINIC | Age: 30
End: 2025-08-08
Payer: COMMERCIAL

## 2025-08-08 ENCOUNTER — TELEPHONE (OUTPATIENT)
Dept: OBSTETRICS AND GYNECOLOGY | Facility: CLINIC | Age: 30
End: 2025-08-08
Payer: COMMERCIAL

## 2025-08-08 ENCOUNTER — TELEPHONE (OUTPATIENT)
Dept: OBSTETRICS AND GYNECOLOGY | Facility: CLINIC | Age: 30
End: 2025-08-08

## 2025-08-08 VITALS
DIASTOLIC BLOOD PRESSURE: 66 MMHG | HEIGHT: 66 IN | WEIGHT: 130.06 LBS | SYSTOLIC BLOOD PRESSURE: 118 MMHG | BODY MASS INDEX: 20.9 KG/M2

## 2025-08-08 DIAGNOSIS — Z11.3 SCREEN FOR STD (SEXUALLY TRANSMITTED DISEASE): ICD-10-CM

## 2025-08-08 DIAGNOSIS — Z01.419 WELL WOMAN EXAM WITH ROUTINE GYNECOLOGICAL EXAM: Primary | ICD-10-CM

## 2025-08-08 LAB
GARDNERELLA VAGINALIS: NEGATIVE
OTHER MICROSC. OBSERVATIONS: NORMAL
POC BACTERIAL VAGINOSIS: NEGATIVE
POC CLUE CELLS: NEGATIVE
TRICHOMONAS, POC: NEGATIVE
YEAST WET PREP: NEGATIVE

## 2025-08-08 PROCEDURE — 99999 PR PBB SHADOW E&M-EST. PATIENT-LVL III: CPT | Mod: PBBFAC,,, | Performed by: OBSTETRICS & GYNECOLOGY

## 2025-08-08 RX ORDER — FLUOXETINE 10 MG/1
10 CAPSULE ORAL
COMMUNITY
Start: 2024-12-03

## 2025-08-08 NOTE — TELEPHONE ENCOUNTER
Patient wanted to know if she will get annual blood work done like cholesterol and cbc. Informed patient that she has to make an appointment with her pcp for her annual and blood work. The annual with obgyn is different. Patient verbalized understanding.

## 2025-08-08 NOTE — PROGRESS NOTES
Subjective     Patient ID: Mariela Kwok is a 29 y.o. female.    Chief Complaint:  Well Woman      History of Present Illness  HPI  Annual Exam-Premenopausal  Patient presents for annual exam. The patient has no complaints today. The patient is not currently sexually active. GYN screening history: last pap: approximate date  and was abnormal: see below. The patient wears seatbelts: yes. The patient participates in regular exercise: yes. Has the patient ever been transfused or tattooed?: no. The patient reports that there is not domestic violence in her life.  Menses are regular with periods lasting 5 days.  Denies excessive bleeding or cramping.    GYN & OB History  Patient's last menstrual period was 07/15/2025.   Date of Last Pap: 8/15/2024    OB History    Para Term  AB Living   0 0 0 0 0 0   SAB IAB Ectopic Multiple Live Births   0 0 0 0 0       Review of Systems  Review of Systems   Constitutional:  Negative for activity change, appetite change, chills, fatigue, fever and unexpected weight change.   Respiratory:  Negative for shortness of breath.    Cardiovascular:  Negative for chest pain, palpitations and leg swelling.   Gastrointestinal:  Negative for abdominal pain, bloating, blood in stool, constipation, diarrhea, nausea and vomiting.   Genitourinary:  Negative for dysmenorrhea, dyspareunia, dysuria, flank pain, frequency, genital sores, hematuria, menorrhagia, menstrual problem, pelvic pain, urgency, vaginal bleeding, vaginal discharge, vaginal pain, urinary incontinence, postcoital bleeding, vaginal dryness and vaginal odor.   Musculoskeletal:  Negative for back pain.   Integumentary:  Negative for breast mass, nipple discharge, breast skin changes and breast tenderness.   Neurological:  Negative for syncope and headaches.   Breast: Negative for asymmetry, lump, mass, mastodynia, nipple discharge, skin changes and tenderness         Objective   Physical Exam:   Constitutional: She is  oriented to person, place, and time. She appears well-developed and well-nourished. No distress.    HENT:   Head: Normocephalic and atraumatic.    Eyes: Pupils are equal, round, and reactive to light. EOM are normal.     Cardiovascular:  Normal rate, regular rhythm and normal heart sounds.             Pulmonary/Chest: Effort normal and breath sounds normal.        Abdominal: Soft. Bowel sounds are normal. She exhibits no distension. There is no abdominal tenderness.     Genitourinary:    Vagina, uterus, right adnexa and left adnexa normal.      Pelvic exam was performed with patient supine.   There is no rash, tenderness, lesion or injury on the right labia. There is no rash, tenderness, lesion or injury on the left labia. Cervix is normal. Right adnexum displays no mass, no tenderness and no fullness. Left adnexum displays no mass, no tenderness and no fullness. No erythema, vaginal discharge, tenderness or bleeding in the vagina.    No foreign body in the vagina.      No signs of injury in the vagina.   Cervix exhibits no motion tenderness, no discharge and no friability.    pap smear completedUterus is not deviated, not enlarged and not tender.    Genitourinary Comments: Wet prep: negative for trichomonas, yeast, or clue cells             Musculoskeletal: Normal range of motion and moves all extremeties. No tenderness or edema.       Neurological: She is alert and oriented to person, place, and time.    Skin: Skin is warm and dry.    Psychiatric: She has a normal mood and affect. Her behavior is normal. Thought content normal.      Pap history:  08/24 - NILM HPV+  09/23 - NILM HPV+        Assessment and Plan     1. Well woman exam with routine gynecological exam    2. Screen for STD (sexually transmitted disease)           Plan:  Well woman exam with routine gynecological exam  -     Liquid-Based Pap Smear, Screening  -     Pt was counseled on cervical/vaginal screening guidelines and recommendations.  See above.   If today's pap smear result is negative, next pap smear will be due in 3 yrs.  -     Pt was advised on current breast cancer screening recommendations.    -     Follow up with PCP for routine health maintenance needs.    Screen for STD (sexually transmitted disease)  -     C. trachomatis/N. gonorrhoeae by AMP DNA  -     POCT Wet Prep      Follow up in about 1 year (around 8/8/2026).

## 2025-08-08 NOTE — TELEPHONE ENCOUNTER
Copied from CRM #[id number]. Topic: General Inquiry - Return Call  >> Aug 8, 2025  8:49 AM Yaima wrote:  .Type:  Patient Returning Call    Who Called:Mariela   Who Left Message for Patient:nurse   Does the patient know what this is regarding?: call back   Would the patient rather a call back or a response via [facility]? Call   Best Call Back Number:.[phone]  Additional Information: Pt requesting a call back

## 2025-08-27 ENCOUNTER — PATIENT MESSAGE (OUTPATIENT)
Dept: OBSTETRICS AND GYNECOLOGY | Facility: CLINIC | Age: 30
End: 2025-08-27
Payer: COMMERCIAL